# Patient Record
Sex: FEMALE | Race: ASIAN | NOT HISPANIC OR LATINO
[De-identification: names, ages, dates, MRNs, and addresses within clinical notes are randomized per-mention and may not be internally consistent; named-entity substitution may affect disease eponyms.]

---
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
## 2023-01-26 ENCOUNTER — APPOINTMENT (OUTPATIENT)
Dept: PEDIATRIC RHEUMATOLOGY | Facility: CLINIC | Age: 18
End: 2023-01-26
Payer: MEDICAID

## 2023-01-26 ENCOUNTER — APPOINTMENT (OUTPATIENT)
Dept: PEDIATRIC RHEUMATOLOGY | Facility: CLINIC | Age: 18
End: 2023-01-26

## 2023-01-26 ENCOUNTER — LABORATORY RESULT (OUTPATIENT)
Age: 18
End: 2023-01-26

## 2023-01-26 VITALS
HEART RATE: 101 BPM | BODY MASS INDEX: 24.27 KG/M2 | WEIGHT: 133.6 LBS | SYSTOLIC BLOOD PRESSURE: 122 MMHG | HEIGHT: 62.01 IN | DIASTOLIC BLOOD PRESSURE: 75 MMHG | TEMPERATURE: 97.8 F

## 2023-01-26 DIAGNOSIS — Z00.00 ENCOUNTER FOR GENERAL ADULT MEDICAL EXAMINATION W/OUT ABNORMAL FINDINGS: ICD-10-CM

## 2023-01-26 PROCEDURE — 99215 OFFICE O/P EST HI 40 MIN: CPT

## 2023-01-27 LAB
ALBUMIN SERPL ELPH-MCNC: 3.7 G/DL
ALP BLD-CCNC: 42 U/L
ALT SERPL-CCNC: 10 U/L
ANION GAP SERPL CALC-SCNC: 10 MMOL/L
APPEARANCE: CLEAR
AST SERPL-CCNC: 14 U/L
BASOPHILS # BLD AUTO: 0.06 K/UL
BASOPHILS NFR BLD AUTO: 0.9 %
BILIRUB SERPL-MCNC: 0.6 MG/DL
BILIRUBIN URINE: NEGATIVE
BLOOD URINE: ABNORMAL
BUN SERPL-MCNC: 10 MG/DL
C3 SERPL-MCNC: 115 MG/DL
C4 SERPL-MCNC: 22 MG/DL
CALCIUM SERPL-MCNC: 9.8 MG/DL
CHLORIDE SERPL-SCNC: 102 MMOL/L
CHOLEST SERPL-MCNC: 220 MG/DL
CO2 SERPL-SCNC: 27 MMOL/L
COLOR: YELLOW
CREAT SERPL-MCNC: 0.44 MG/DL
CREAT SPEC-SCNC: 139 MG/DL
CREAT/PROT UR: 0.5 RATIO
CRP SERPL-MCNC: <3 MG/L
EGFR: 143 ML/MIN/1.73M2
EOSINOPHIL # BLD AUTO: 0.12 K/UL
EOSINOPHIL NFR BLD AUTO: 1.8 %
ERYTHROCYTE [SEDIMENTATION RATE] IN BLOOD BY WESTERGREN METHOD: 47 MM/HR
ESTIMATED AVERAGE GLUCOSE: 117 MG/DL
GLUCOSE QUALITATIVE U: NEGATIVE
GLUCOSE SERPL-MCNC: 96 MG/DL
HBA1C MFR BLD HPLC: 5.7 %
HCT VFR BLD CALC: 40.5 %
HDLC SERPL-MCNC: 77 MG/DL
HGB BLD-MCNC: 12.8 G/DL
IMM GRANULOCYTES NFR BLD AUTO: 0.3 %
KETONES URINE: NEGATIVE
LDLC SERPL CALC-MCNC: 128 MG/DL
LEUKOCYTE ESTERASE URINE: NEGATIVE
LYMPHOCYTES # BLD AUTO: 2.94 K/UL
LYMPHOCYTES NFR BLD AUTO: 43.1 %
MAN DIFF?: NORMAL
MCHC RBC-ENTMCNC: 28.6 PG
MCHC RBC-ENTMCNC: 31.6 GM/DL
MCV RBC AUTO: 90.6 FL
MONOCYTES # BLD AUTO: 0.63 K/UL
MONOCYTES NFR BLD AUTO: 9.2 %
NEUTROPHILS # BLD AUTO: 3.05 K/UL
NEUTROPHILS NFR BLD AUTO: 44.7 %
NITRITE URINE: NEGATIVE
NONHDLC SERPL-MCNC: 143 MG/DL
PH URINE: 6
PLATELET # BLD AUTO: 353 K/UL
POTASSIUM SERPL-SCNC: 4.2 MMOL/L
PROT SERPL-MCNC: 6.2 G/DL
PROT UR-MCNC: 76 MG/DL
PROTEIN URINE: ABNORMAL
RBC # BLD: 4.47 M/UL
RBC # FLD: 11.8 %
SODIUM SERPL-SCNC: 139 MMOL/L
SPECIFIC GRAVITY URINE: 1.03
TRIGL SERPL-MCNC: 73 MG/DL
UROBILINOGEN URINE: NORMAL
WBC # FLD AUTO: 6.82 K/UL

## 2023-01-30 ENCOUNTER — NON-APPOINTMENT (OUTPATIENT)
Age: 18
End: 2023-01-30

## 2023-01-30 LAB — DSDNA AB SER-ACNC: 25 IU/ML

## 2023-01-30 NOTE — CONSULT LETTER
[Dear  ___] : Dear  [unfilled], [Courtesy Letter:] : I had the pleasure of seeing your patient, [unfilled], in my office today. [Please see my note below.] : Please see my note below. [Consult Closing:] : Thank you very much for allowing me to participate in the care of this patient.  If you have any questions, please do not hesitate to contact me. [Sincerely,] : Sincerely, [FreeTextEntry2] : Dr. Eugenio Hills\par [address] [place] [address], [address]\[place] [id number] [FreeTextEntry3] : Dacia López MD \par Pediatric Rheumatology Fellow \par [facility]

## 2023-01-30 NOTE — PHYSICAL EXAM
[Normal] : normal [PERRLA] : CAIO [Lips] : normal lips [Oral] : normal oral cavity  [Mucosa] : moist and pink mucosa [Palate] : normal palate [S1, S2 Present] : S1, S2 present [Peripheral Pulses] : positive peripheral pulses [Respiratory Effort] : normal respiratory effort [Clear to auscultation] : clear to auscultation [Soft] : soft [NonTender] : non tender [Non Distended] : non distended [Normal Bowel Sounds] : normal bowel sounds [No Hepatosplenomegaly] : no hepatosplenomegaly [No Abnormal Lymph Nodes Palpated] : no abnormal lymph nodes palpated [Refer to Joint Diagram Below] : refer to joint diagram below [Range Of Motion] : full range of motion [Cranial nerves grossly intact] : cranial nerves grossly intact [Intact Judgement] : intact judgement  [Insight Insight] : intact insight [Not Examined] : not examined [1] : 1 [_______] : Knee: [unfilled] [Acute distress] : no acute distress [Malar Erythema] : no malar erythema [Erythematous Conjunctiva] : nonerythematous conjunctiva [Erythematous Oropharynx] : nonerythematous oropharynx [Ulcers] : no ulcers [Lesions] : no lesions [Induration] : no induration [Erythematous] : not erythematous [Mass (___cm)] : no neck masses [Murmurs] : no murmurs [Peripheral Edema] : no peripheral edema  [Joint effusions] : no joint effusions [FreeTextEntry1] : well-appearing, improving Cushingoid facies [de-identified] : keratosis pilaris of bilateral arms, small callus on left 5th finger [de-identified] : no active arthritis today [NumbJointsActiveArthritis] : 0

## 2023-01-30 NOTE — HISTORY OF PRESENT ILLNESS
[VANNESSA] : VANNESSA [ds-DNA] : ds-DNA [Sm] : Sm [RNP] : RNP [SSA] : SSA [Yes] : The patient is using sunscreen [Glomerulonephritis] : glomerulonephritis [Noncontributory] : The patient's family history was noncontributory [Unlimited ADLs] : able to do activities of daily living without limitations [FreeTextEntry1] : Domonique presents for follow-up today. She was last seen on 11/2022 [facility] \par \par Domonique is feeling well. No complaints today. Drinking 4-5 cups of water per day. Not checking BPs at home. Normal appetite. \par \par Domonique has moved into her girlfriend's home and also got a new kitten, named Ching. \par \par Saw her primary care physician last week for physical exam - requesting A1c and lipids to be checked today. \par \par Current medications: \par - Prednisone 15 mg PO daily \par - CellCept 1500 mg PO BID \par -  mg PO daily \par - Lisinopril 30 mg PO daily \par - Vitamin D daily \par \par LMP: 1/9\par \par No fever, headache, visual changes, dizziness. mouth sores, cough, congestion, chest pain, difficulty breathing, nausea, vomiting, diarrhea, constipation, blood in the stool, abdominal pain, dysuria, hematuria, joint pain, peripheral edema, back pain, or rash.  [SLEDXDATE] : 05/20/2022

## 2023-01-30 NOTE — SOCIAL HISTORY
[Mother] : mother [___ Sisters] : [unfilled] sisters [College] : College [Sexually Active] : patient is sexually active [de-identified] : Father lives in [place] due to work [FreeTextEntry1] : Attends [employer], first year, studying business management

## 2023-01-30 NOTE — END OF VISIT
[] : Fellow [FreeTextEntry3] : I discussed this patient in a pre-clinic session with the fellow including clinical status and last set of laboratory testing results. I also saw the patient and discussed history, completed an exam and discussed the plan together with the fellow.\par \par Total time spent today included reviewing prior notes, results, and time with patient/parent.\par Time spent -40      minutes\par

## 2023-02-02 ENCOUNTER — APPOINTMENT (OUTPATIENT)
Dept: PEDIATRIC NEPHROLOGY | Facility: CLINIC | Age: 18
End: 2023-02-02
Payer: MEDICAID

## 2023-02-02 VITALS
DIASTOLIC BLOOD PRESSURE: 73 MMHG | SYSTOLIC BLOOD PRESSURE: 110 MMHG | WEIGHT: 134.56 LBS | HEIGHT: 62.4 IN | HEART RATE: 89 BPM | BODY MASS INDEX: 24.45 KG/M2 | TEMPERATURE: 97.88 F

## 2023-02-02 DIAGNOSIS — Z87.898 PERSONAL HISTORY OF OTHER SPECIFIED CONDITIONS: ICD-10-CM

## 2023-02-02 DIAGNOSIS — Z87.39 PERSONAL HISTORY OF OTHER DISEASES OF THE MUSCULOSKELETAL SYSTEM AND CONNECTIVE TISSUE: ICD-10-CM

## 2023-02-02 PROCEDURE — 81003 URINALYSIS AUTO W/O SCOPE: CPT | Mod: QW

## 2023-02-02 PROCEDURE — 99214 OFFICE O/P EST MOD 30 MIN: CPT | Mod: 25

## 2023-02-13 PROBLEM — Z87.39 HISTORY OF ARTHRITIS: Status: RESOLVED | Noted: 2022-06-08 | Resolved: 2022-10-20

## 2023-02-13 PROBLEM — Z87.898 HISTORY OF EDEMA: Status: RESOLVED | Noted: 2023-02-13 | Resolved: 2023-02-13

## 2023-02-13 NOTE — PHYSICAL EXAM
[Well Developed] : well developed [Well Nourished] : well nourished [Normal] : alert, oriented as age-appropriate, affect appropriate; no weakness, no facial asymmetry, moves all extremities normal gait- child older than 18 months [de-identified] : normocephalic atraumatic no conjunctival injection intact extraocular movements, sclera not icteric moist mucous membranes

## 2023-02-13 NOTE — CONSULT LETTER
[Consult Letter:] : I had the pleasure of evaluating your patient, [unfilled]. [Please see my note below.] : Please see my note below. [Consult Closing:] : Thank you very much for allowing me to participate in the care of this patient.  If you have any questions, please do not hesitate to contact me. [Sincerely,] : Sincerely, [FreeTextEntry3] : Antoinette Jacobson MD MSc\par Pediatric Nephrology\par [facility] \par [phone]\par

## 2023-02-23 ENCOUNTER — APPOINTMENT (OUTPATIENT)
Dept: PEDIATRIC RHEUMATOLOGY | Facility: CLINIC | Age: 18
End: 2023-02-23
Payer: MEDICAID

## 2023-02-23 ENCOUNTER — LABORATORY RESULT (OUTPATIENT)
Age: 18
End: 2023-02-23

## 2023-02-23 VITALS
WEIGHT: 137 LBS | HEIGHT: 62.4 IN | SYSTOLIC BLOOD PRESSURE: 108 MMHG | BODY MASS INDEX: 24.89 KG/M2 | DIASTOLIC BLOOD PRESSURE: 70 MMHG | TEMPERATURE: 207.68 F | HEART RATE: 88 BPM

## 2023-02-23 PROCEDURE — 99215 OFFICE O/P EST HI 40 MIN: CPT

## 2023-02-23 NOTE — HISTORY OF PRESENT ILLNESS
[VANNESSA] : VANNESSA [ds-DNA] : ds-DNA [Sm] : Sm [RNP] : RNP [SSA] : SSA [Yes] : The patient is using sunscreen [Glomerulonephritis] : glomerulonephritis [Noncontributory] : The patient's family history was noncontributory [Unlimited ADLs] : able to do activities of daily living without limitations [FreeTextEntry1] : Domonique presents for follow-up today. \par \par Domonique is feeling well. No complaints today. Drinking 36+ oz water per day. Not checking BPs at home. Normal appetite. \par \par Domonique has moved into her girlfriend's home and also got a new kitten, named Ching. School is going well. \par \par Noted a small overgrown nail on left 2nd toe since yesterday - using antiseptic solution to prevent infection. No fevers. \par \par Current medications: \par - Prednisone 10 mg PO daily \par - CellCept 1500 mg PO BID \par -  mg PO daily \par - Lisinopril 30 mg PO daily \par - Vitamin D daily (OTC) \par \par LMP: 2/19 - 2/22 \par \par No fever, headache, visual changes, dizziness. mouth sores, cough, congestion, chest pain, difficulty breathing, nausea, vomiting, diarrhea, constipation, blood in the stool, abdominal pain, dysuria, hematuria, joint pain, peripheral edema, back pain, or rash.  [SLEDXDATE] : 05/20/2022

## 2023-02-23 NOTE — PHYSICAL EXAM
[Normal] : normal [PERRLA] : CAIO [Lips] : normal lips [Oral] : normal oral cavity  [Mucosa] : moist and pink mucosa [Palate] : normal palate [S1, S2 Present] : S1, S2 present [Peripheral Pulses] : positive peripheral pulses [Respiratory Effort] : normal respiratory effort [Clear to auscultation] : clear to auscultation [Soft] : soft [NonTender] : non tender [Non Distended] : non distended [Normal Bowel Sounds] : normal bowel sounds [No Hepatosplenomegaly] : no hepatosplenomegaly [No Abnormal Lymph Nodes Palpated] : no abnormal lymph nodes palpated [Refer to Joint Diagram Below] : refer to joint diagram below [Range Of Motion] : full range of motion [Cranial nerves grossly intact] : cranial nerves grossly intact [Intact Judgement] : intact judgement  [Insight Insight] : intact insight [Not Examined] : not examined [1] : 1 [_______] : Knee: [unfilled] [Acute distress] : no acute distress [Malar Erythema] : no malar erythema [Erythematous Conjunctiva] : nonerythematous conjunctiva [Erythematous Oropharynx] : nonerythematous oropharynx [Ulcers] : no ulcers [Lesions] : no lesions [Induration] : no induration [Erythematous] : not erythematous [Mass (___cm)] : no neck masses [Murmurs] : no murmurs [Peripheral Edema] : no peripheral edema  [Joint effusions] : no joint effusions [FreeTextEntry1] : well-appearing, improving Cushingoid facies [de-identified] : keratosis pilaris of bilateral arms, ingrown nail on left 2nd toe with surrounding erythema [de-identified] : no active arthritis today [NumbJointsActiveArthritis] : 0

## 2023-02-23 NOTE — CONSULT LETTER
[Dear  ___] : Dear  [unfilled], [Courtesy Letter:] : I had the pleasure of seeing your patient, [unfilled], in my office today. [Please see my note below.] : Please see my note below. [Consult Closing:] : Thank you very much for allowing me to participate in the care of this patient.  If you have any questions, please do not hesitate to contact me. [Sincerely,] : Sincerely, [FreeTextEntry2] : Dr. Eugenio Hills\par [address] [place] [address], [address]\[place] [id number] [FreeTextEntry3] : Dacia López MD \par Pediatric Rheumatology Fellow \par [facility]

## 2023-02-23 NOTE — END OF VISIT
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 Overall feeling well. Has slight overgrowth of nail on L. 2nd toe with some erythema; no fevers reported. ROS otherwise negative. \par PE: L. 2nd toe with some slight erythema around nail bed, no pain/tenderness; no objective arthritis; keratosis pilaris on upper extremities b/l; oropharynx clear \par A/P: Domonique is a 18yo F with SLE and Class IV/V LN, currently managed on prednisone, MMF, HCQ and Lisinopril; she reports compliance and is overall well. Continue prednisone taper. To monitor toe symptoms and to seek medical evaluation if any worsening of pain, erythema, or any fevers. Needs ophthalmology follow-up. Return precautions otherwise reviewed. Follow-up in one month. Patient verbalized understanding of this plan. \par  [Time Spent: ___ minutes] : I have spent [unfilled] minutes of time on the encounter.

## 2023-02-23 NOTE — SOCIAL HISTORY
[Mother] : mother [___ Sisters] : [unfilled] sisters [College] : College [Sexually Active] : patient is sexually active [de-identified] : Father lives in [place] due to work [FreeTextEntry1] : Attends [employer], first year, studying business management

## 2023-02-24 ENCOUNTER — NON-APPOINTMENT (OUTPATIENT)
Age: 18
End: 2023-02-24

## 2023-02-24 LAB
ALBUMIN SERPL ELPH-MCNC: 3.9 G/DL
ALP BLD-CCNC: 45 U/L
ALT SERPL-CCNC: 14 U/L
ANION GAP SERPL CALC-SCNC: 11 MMOL/L
APPEARANCE: CLEAR
AST SERPL-CCNC: 17 U/L
BASOPHILS # BLD AUTO: 0.02 K/UL
BASOPHILS NFR BLD AUTO: 0.3 %
BILIRUB SERPL-MCNC: 0.2 MG/DL
BILIRUBIN URINE: NEGATIVE
BLOOD URINE: ABNORMAL
BUN SERPL-MCNC: 11 MG/DL
C3 SERPL-MCNC: 119 MG/DL
C4 SERPL-MCNC: 21 MG/DL
CALCIUM SERPL-MCNC: 9.9 MG/DL
CHLORIDE SERPL-SCNC: 101 MMOL/L
CO2 SERPL-SCNC: 26 MMOL/L
COLOR: NORMAL
CREAT SERPL-MCNC: 0.41 MG/DL
CREAT SPEC-SCNC: 66 MG/DL
CREAT/PROT UR: 0.3 RATIO
CRP SERPL-MCNC: <3 MG/L
EGFR: 145 ML/MIN/1.73M2
EOSINOPHIL # BLD AUTO: 0.02 K/UL
EOSINOPHIL NFR BLD AUTO: 0.3 %
ERYTHROCYTE [SEDIMENTATION RATE] IN BLOOD BY WESTERGREN METHOD: 20 MM/HR
GLUCOSE QUALITATIVE U: NEGATIVE
GLUCOSE SERPL-MCNC: 102 MG/DL
HCT VFR BLD CALC: 39 %
HGB BLD-MCNC: 12.5 G/DL
IMM GRANULOCYTES NFR BLD AUTO: 0.5 %
KETONES URINE: NEGATIVE
LEUKOCYTE ESTERASE URINE: NEGATIVE
LYMPHOCYTES # BLD AUTO: 1.56 K/UL
LYMPHOCYTES NFR BLD AUTO: 23.4 %
MAN DIFF?: NORMAL
MCHC RBC-ENTMCNC: 29.1 PG
MCHC RBC-ENTMCNC: 32.1 GM/DL
MCV RBC AUTO: 90.9 FL
MONOCYTES # BLD AUTO: 0.28 K/UL
MONOCYTES NFR BLD AUTO: 4.2 %
NEUTROPHILS # BLD AUTO: 4.75 K/UL
NEUTROPHILS NFR BLD AUTO: 71.3 %
NITRITE URINE: NEGATIVE
PH URINE: 6
PLATELET # BLD AUTO: 349 K/UL
POTASSIUM SERPL-SCNC: 4.6 MMOL/L
PROT SERPL-MCNC: 6.1 G/DL
PROT UR-MCNC: 18 MG/DL
PROTEIN URINE: NORMAL
RBC # BLD: 4.29 M/UL
RBC # FLD: 11.9 %
SODIUM SERPL-SCNC: 138 MMOL/L
SPECIFIC GRAVITY URINE: 1.02
UROBILINOGEN URINE: NORMAL
WBC # FLD AUTO: 6.66 K/UL

## 2023-02-27 LAB — DSDNA AB SER-ACNC: 23 IU/ML

## 2023-03-22 ENCOUNTER — RX RENEWAL (OUTPATIENT)
Age: 18
End: 2023-03-22

## 2023-03-23 ENCOUNTER — APPOINTMENT (OUTPATIENT)
Dept: PEDIATRIC RHEUMATOLOGY | Facility: CLINIC | Age: 18
End: 2023-03-23
Payer: MEDICAID

## 2023-03-23 VITALS
SYSTOLIC BLOOD PRESSURE: 109 MMHG | HEIGHT: 62.4 IN | DIASTOLIC BLOOD PRESSURE: 71 MMHG | HEART RATE: 85 BPM | WEIGHT: 139.33 LBS | BODY MASS INDEX: 25.32 KG/M2 | TEMPERATURE: 207.86 F

## 2023-03-23 PROCEDURE — 99215 OFFICE O/P EST HI 40 MIN: CPT

## 2023-03-24 LAB
ALBUMIN SERPL ELPH-MCNC: 3.8 G/DL
ALP BLD-CCNC: 51 U/L
ALT SERPL-CCNC: 17 U/L
ANION GAP SERPL CALC-SCNC: 11 MMOL/L
AST SERPL-CCNC: 17 U/L
BILIRUB SERPL-MCNC: 0.3 MG/DL
BUN SERPL-MCNC: 11 MG/DL
CALCIUM SERPL-MCNC: 9.1 MG/DL
CHLORIDE SERPL-SCNC: 104 MMOL/L
CO2 SERPL-SCNC: 22 MMOL/L
CREAT SERPL-MCNC: 0.47 MG/DL
EGFR: 141 ML/MIN/1.73M2
ERYTHROCYTE [SEDIMENTATION RATE] IN BLOOD BY WESTERGREN METHOD: 16 MM/HR
GLUCOSE SERPL-MCNC: 104 MG/DL
POTASSIUM SERPL-SCNC: 4.6 MMOL/L
PROT SERPL-MCNC: 6.1 G/DL
SODIUM SERPL-SCNC: 138 MMOL/L

## 2023-03-26 LAB
BASOPHILS # BLD AUTO: 0.03 K/UL
BASOPHILS NFR BLD AUTO: 0.4 %
C3 SERPL-MCNC: 115 MG/DL
C4 SERPL-MCNC: 23 MG/DL
CRP SERPL-MCNC: <3 MG/L
DSDNA AB SER-ACNC: 26 IU/ML
EOSINOPHIL # BLD AUTO: 0.08 K/UL
EOSINOPHIL NFR BLD AUTO: 1.2 %
HCT VFR BLD CALC: 37.1 %
HGB BLD-MCNC: 12 G/DL
IMM GRANULOCYTES NFR BLD AUTO: 0.3 %
LYMPHOCYTES # BLD AUTO: 1.85 K/UL
LYMPHOCYTES NFR BLD AUTO: 27.4 %
MAN DIFF?: NORMAL
MCHC RBC-ENTMCNC: 29.3 PG
MCHC RBC-ENTMCNC: 32.3 GM/DL
MCV RBC AUTO: 90.5 FL
MONOCYTES # BLD AUTO: 0.45 K/UL
MONOCYTES NFR BLD AUTO: 6.7 %
NEUTROPHILS # BLD AUTO: 4.33 K/UL
NEUTROPHILS NFR BLD AUTO: 64 %
PLATELET # BLD AUTO: 336 K/UL
RBC # BLD: 4.1 M/UL
RBC # FLD: 11.8 %
WBC # FLD AUTO: 6.76 K/UL

## 2023-04-03 NOTE — CONSULT LETTER
[Dear  ___] : Dear  [unfilled], [Courtesy Letter:] : I had the pleasure of seeing your patient, [unfilled], in my office today. [Please see my note below.] : Please see my note below. [Consult Closing:] : Thank you very much for allowing me to participate in the care of this patient.  If you have any questions, please do not hesitate to contact me. [Sincerely,] : Sincerely, [FreeTextEntry2] : Dr. Eugenio Hills\par [address] [place] [address], [address]\[place] [phone] [FreeTextEntry3] : Dacia López MD \par Pediatric Rheumatology Fellow \par [facility]

## 2023-04-03 NOTE — PHYSICAL EXAM
[Normal] : normal [PERRLA] : CAIO [Lips] : normal lips [Oral] : normal oral cavity  [Mucosa] : moist and pink mucosa [Palate] : normal palate [S1, S2 Present] : S1, S2 present [Peripheral Pulses] : positive peripheral pulses [Respiratory Effort] : normal respiratory effort [Clear to auscultation] : clear to auscultation [Soft] : soft [NonTender] : non tender [Non Distended] : non distended [Normal Bowel Sounds] : normal bowel sounds [No Hepatosplenomegaly] : no hepatosplenomegaly [No Abnormal Lymph Nodes Palpated] : no abnormal lymph nodes palpated [Refer to Joint Diagram Below] : refer to joint diagram below [Range Of Motion] : full range of motion [Cranial nerves grossly intact] : cranial nerves grossly intact [Intact Judgement] : intact judgement  [Not Examined] : not examined [Insight Insight] : intact insight [1] : 1 [_______] : Knee: [unfilled] [Acute distress] : no acute distress [Malar Erythema] : no malar erythema [Erythematous Conjunctiva] : nonerythematous conjunctiva [Erythematous Oropharynx] : nonerythematous oropharynx [Ulcers] : no ulcers [Lesions] : no lesions [Induration] : no induration [Erythematous] : not erythematous [Mass (___cm)] : no neck masses [Murmurs] : no murmurs [Peripheral Edema] : no peripheral edema  [Joint effusions] : no joint effusions [FreeTextEntry1] : well-appearing, improving Cushingoid facies [de-identified] : keratosis pilaris of bilateral arms [de-identified] : no active arthritis [NumbJointsActiveArthritis] : 0

## 2023-04-03 NOTE — SOCIAL HISTORY
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

## 2023-04-03 NOTE — END OF VISIT
[] : Fellow [FreeTextEntry3] : doing well on current medication regimen without clinical or serologic evidence of disease flare.\par Continue steroid taper.\par \par Plan well outlined per Dr López above.\par \par I discussed this patient in a pre-clinic session with the fellow including clinical status and last set of laboratory testing results. I also saw the patient and discussed history, completed an exam and discussed the plan together with the fellow.\par \par Total time spent today included reviewing prior notes, results, and time with patient/parent.\par Time spent -    41  minutes\par

## 2023-04-03 NOTE — HISTORY OF PRESENT ILLNESS
[VANNESSA] : VANNESSA [ds-DNA] : ds-DNA [Sm] : Sm [RNP] : RNP [SSA] : SSA [Yes] : The patient is using sunscreen [Glomerulonephritis] : glomerulonephritis [Noncontributory] : The patient's family history was noncontributory [Unlimited ADLs] : able to do activities of daily living without limitations [FreeTextEntry1] : Domonique presents for follow-up today. \par \par Domonique is feeling well. No complaints today. Normal appetite. Ingrown toenail healed without any issues. \par \par Domonique has moved into her girlfriend's home and also got a new kitten, named Ching. School is going well. Planning to travel to [place] during her spring break. 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 mouth sores, cough, congestion, chest pain, difficulty breathing, nausea, vomiting, diarrhea, constipation, blood in the stool, abdominal pain, dysuria, hematuria, joint pain, peripheral edema, back pain, or rash.  [SLEDXDATE] : 05/20/2022

## 2023-04-04 ENCOUNTER — APPOINTMENT (OUTPATIENT)
Dept: PEDIATRIC NEPHROLOGY | Facility: CLINIC | Age: 18
End: 2023-04-04
Payer: MEDICAID

## 2023-04-04 VITALS
TEMPERATURE: 97.52 F | HEART RATE: 99 BPM | WEIGHT: 135.8 LBS | SYSTOLIC BLOOD PRESSURE: 108 MMHG | DIASTOLIC BLOOD PRESSURE: 75 MMHG | BODY MASS INDEX: 24.68 KG/M2 | HEIGHT: 62.2 IN

## 2023-04-04 PROCEDURE — 99214 OFFICE O/P EST MOD 30 MIN: CPT | Mod: 25

## 2023-04-04 PROCEDURE — 81003 URINALYSIS AUTO W/O SCOPE: CPT | Mod: QW

## 2023-04-05 ENCOUNTER — NON-APPOINTMENT (OUTPATIENT)
Age: 18
End: 2023-04-05

## 2023-04-05 LAB
APPEARANCE: CLEAR
BACTERIA: NEGATIVE
BILIRUBIN URINE: NEGATIVE
BLOOD URINE: ABNORMAL
COLOR: YELLOW
CREAT SPEC-SCNC: 112 MG/DL
CREAT/PROT UR: 0.3 RATIO
GLUCOSE QUALITATIVE U: NEGATIVE
HYALINE CASTS: 1 /LPF
KETONES URINE: NEGATIVE
LEUKOCYTE ESTERASE URINE: NEGATIVE
MICROSCOPIC-UA: NORMAL
NITRITE URINE: NEGATIVE
PH URINE: 6
PROT UR-MCNC: 30 MG/DL
PROTEIN URINE: ABNORMAL
RED BLOOD CELLS URINE: 7 /HPF
SPECIFIC GRAVITY URINE: 1.02
SQUAMOUS EPITHELIAL CELLS: 1 /HPF
UROBILINOGEN URINE: NORMAL
WHITE BLOOD CELLS URINE: 2 /HPF

## 2023-04-17 NOTE — CONSULT LETTER
[Consult Letter:] : I had the pleasure of evaluating your patient, [unfilled]. [Please see my note below.] : Please see my note below. [Consult Closing:] : Thank you very much for allowing me to participate in the care of this patient.  If you have any questions, please do not hesitate to contact me. [Sincerely,] : Sincerely, [FreeTextEntry3] : Antoinette Jacobson MD MSc\par Pediatric Nephrology\par [facility] \par [phone]\par

## 2023-04-17 NOTE — REASON FOR VISIT
[Follow-Up] : a follow-up visit for [Patient] : patient [Medical Records] : medical records [FreeTextEntry3] : Lupus Nephritis class IV/V

## 2023-04-17 NOTE — PHYSICAL EXAM
[Well Developed] : well developed [Well Nourished] : well nourished [Normal] : alert, oriented as age-appropriate, affect appropriate; no weakness, no facial asymmetry, moves all extremities normal gait- child older than 18 months [de-identified] : normocephalic atraumatic no conjunctival injection intact extraocular movements, sclera not icteric moist mucous membranes

## 2023-04-25 ENCOUNTER — NON-APPOINTMENT (OUTPATIENT)
Age: 18
End: 2023-04-25

## 2023-04-27 ENCOUNTER — LABORATORY RESULT (OUTPATIENT)
Age: 18
End: 2023-04-27

## 2023-04-27 ENCOUNTER — NON-APPOINTMENT (OUTPATIENT)
Age: 18
End: 2023-04-27

## 2023-04-27 ENCOUNTER — APPOINTMENT (OUTPATIENT)
Dept: PEDIATRIC RHEUMATOLOGY | Facility: CLINIC | Age: 18
End: 2023-04-27
Payer: MEDICAID

## 2023-04-27 VITALS
HEART RATE: 90 BPM | DIASTOLIC BLOOD PRESSURE: 82 MMHG | TEMPERATURE: 98 F | BODY MASS INDEX: 24.32 KG/M2 | WEIGHT: 133.82 LBS | HEIGHT: 62.28 IN | SYSTOLIC BLOOD PRESSURE: 117 MMHG

## 2023-04-27 PROCEDURE — 99215 OFFICE O/P EST HI 40 MIN: CPT

## 2023-04-27 RX ORDER — PREDNISONE 5 MG/1
5 TABLET ORAL
Qty: 20 | Refills: 0 | Status: DISCONTINUED | COMMUNITY
Start: 2022-12-16 | End: 2023-04-27

## 2023-04-27 NOTE — PHYSICAL EXAM
[Normal] : normal [PERRLA] : CAIO [Lips] : normal lips [Oral] : normal oral cavity  [Mucosa] : moist and pink mucosa [Palate] : normal palate [S1, S2 Present] : S1, S2 present [Peripheral Pulses] : positive peripheral pulses [Respiratory Effort] : normal respiratory effort [Clear to auscultation] : clear to auscultation [Soft] : soft [NonTender] : non tender [Non Distended] : non distended [Normal Bowel Sounds] : normal bowel sounds [No Hepatosplenomegaly] : no hepatosplenomegaly [No Abnormal Lymph Nodes Palpated] : no abnormal lymph nodes palpated [Refer to Joint Diagram Below] : refer to joint diagram below [Range Of Motion] : full range of motion [Cranial nerves grossly intact] : cranial nerves grossly intact [Intact Judgement] : intact judgement  [Insight Insight] : intact insight [Not Examined] : not examined [_______] : Knee: [unfilled] [0] : 0 [Acute distress] : no acute distress [Malar Erythema] : no malar erythema [Erythematous Conjunctiva] : nonerythematous conjunctiva [Erythematous Oropharynx] : nonerythematous oropharynx [Ulcers] : no ulcers [Lesions] : no lesions [Induration] : no induration [Erythematous] : not erythematous [Mass (___cm)] : no neck masses [Murmurs] : no murmurs [Peripheral Edema] : no peripheral edema  [Joint effusions] : no joint effusions [FreeTextEntry1] : well-appearing, improving Cushingoid facies [de-identified] : keratosis pilaris of bilateral arms [de-identified] : no active arthritis [NumbJointsActiveArthritis] : 0

## 2023-04-27 NOTE — CONSULT LETTER
[Dear  ___] : Dear  [unfilled], [Courtesy Letter:] : I had the pleasure of seeing your patient, [unfilled], in my office today. [Please see my note below.] : Please see my note below. [Consult Closing:] : Thank you very much for allowing me to participate in the care of this patient.  If you have any questions, please do not hesitate to contact me. [Sincerely,] : Sincerely, [FreeTextEntry2] : Dr. Eugenio Hills\par [address] [place] [address], [address]\[place] [phone] [FreeTextEntry3] : Dacia López MD \par Pediatric Rheumatology Fellow \par [facility]

## 2023-04-27 NOTE — SOCIAL HISTORY
[Mother] : mother [___ Sisters] : [unfilled] sisters [College] : College [Sexually Active] : patient is sexually active [de-identified] : Father lives in [place] due to work [FreeTextEntry1] : Attends [employer], first year, studying business management

## 2023-04-27 NOTE — HISTORY OF PRESENT ILLNESS
[VANNESSA] : VANNESSA [ds-DNA] : ds-DNA [Sm] : Sm [RNP] : RNP [SSA] : SSA [Yes] : The patient is using sunscreen [Glomerulonephritis] : glomerulonephritis [Noncontributory] : The patient's family history was noncontributory [Unlimited ADLs] : able to do activities of daily living without limitations [FreeTextEntry1] : Qi presents for follow-up today. \par \par Qi is feeling well. No complaints today. Normal appetite. Some knee pain while going up and down stairs. No joint swelling or morning stiffness. Off steroids for the past week. \par \par Current medications: \par - CellCept 1500 mg PO BID \par -  mg PO daily \par - Lisinopril 30 mg PO daily \par - Vitamin D daily (OTC) \par \par LMP: April 1 \par \par Using sunscreen consistently. 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 She also plans to visit [place] from May 29-30. 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 mouth sores, cough, congestion, chest pain, difficulty breathing, nausea, vomiting, diarrhea, constipation, blood in the stool, abdominal pain, dysuria, hematuria, joint pain, peripheral edema, back pain, or rash.  [SLEDXDATE] : 05/20/2022

## 2023-04-27 NOTE — END OF VISIT
[] : Fellow [FreeTextEntry3] : \par Qi is doping well She has managed to wean off steroids Will check her labs and she should FU in 6-8- weeks

## 2023-04-28 ENCOUNTER — NON-APPOINTMENT (OUTPATIENT)
Age: 18
End: 2023-04-28

## 2023-04-28 LAB
25(OH)D3 SERPL-MCNC: 32.3 NG/ML
ALBUMIN SERPL ELPH-MCNC: 3.9 G/DL
ALP BLD-CCNC: 55 U/L
ALT SERPL-CCNC: 12 U/L
ANION GAP SERPL CALC-SCNC: 11 MMOL/L
APPEARANCE: CLEAR
AST SERPL-CCNC: 17 U/L
B2 GLYCOPROT1 IGA SERPL IA-ACNC: <5 SAU
B2 GLYCOPROT1 IGG SER-ACNC: <5 SGU
B2 GLYCOPROT1 IGM SER-ACNC: 8.1 SMU
BASOPHILS # BLD AUTO: 0.03 K/UL
BASOPHILS NFR BLD AUTO: 0.6 %
BILIRUB SERPL-MCNC: 0.6 MG/DL
BILIRUBIN URINE: NEGATIVE
BLOOD URINE: ABNORMAL
BUN SERPL-MCNC: 9 MG/DL
C3 SERPL-MCNC: 118 MG/DL
C4 SERPL-MCNC: 22 MG/DL
CALCIUM SERPL-MCNC: 9.5 MG/DL
CARDIOLIPIN AB SER IA-ACNC: NEGATIVE
CHLORIDE SERPL-SCNC: 104 MMOL/L
CO2 SERPL-SCNC: 23 MMOL/L
COLOR: YELLOW
CREAT SERPL-MCNC: 0.46 MG/DL
CREAT SPEC-SCNC: 106 MG/DL
CREAT/PROT UR: 0.4 RATIO
CRP SERPL-MCNC: <3 MG/L
EGFR: 141 ML/MIN/1.73M2
ENA RNP AB SER IA-ACNC: >8 AL
ENA SM AB SER IA-ACNC: >8 AL
ENA SS-A AB SER IA-ACNC: 0.2 AL
ENA SS-B AB SER IA-ACNC: <0.2 AL
EOSINOPHIL # BLD AUTO: 0.21 K/UL
EOSINOPHIL NFR BLD AUTO: 4.2 %
ERYTHROCYTE [SEDIMENTATION RATE] IN BLOOD BY WESTERGREN METHOD: 28 MM/HR
GLUCOSE QUALITATIVE U: NEGATIVE MG/DL
GLUCOSE SERPL-MCNC: 90 MG/DL
HCT VFR BLD CALC: 37.4 %
HGB BLD-MCNC: 12.3 G/DL
IMM GRANULOCYTES NFR BLD AUTO: 0.2 %
KETONES URINE: NEGATIVE MG/DL
LEUKOCYTE ESTERASE URINE: NEGATIVE
LYMPHOCYTES # BLD AUTO: 1.99 K/UL
LYMPHOCYTES NFR BLD AUTO: 39.6 %
MAN DIFF?: NORMAL
MCHC RBC-ENTMCNC: 29.4 PG
MCHC RBC-ENTMCNC: 32.9 GM/DL
MCV RBC AUTO: 89.3 FL
MONOCYTES # BLD AUTO: 0.61 K/UL
MONOCYTES NFR BLD AUTO: 12.2 %
NEUTROPHILS # BLD AUTO: 2.17 K/UL
NEUTROPHILS NFR BLD AUTO: 43.2 %
NITRITE URINE: NEGATIVE
PH URINE: 6.5
PLATELET # BLD AUTO: 294 K/UL
POTASSIUM SERPL-SCNC: 4.6 MMOL/L
PROT SERPL-MCNC: 6.6 G/DL
PROT UR-MCNC: 37 MG/DL
PROTEIN URINE: 30 MG/DL
RBC # BLD: 4.19 M/UL
RBC # FLD: 12 %
SODIUM SERPL-SCNC: 138 MMOL/L
SPECIFIC GRAVITY URINE: 1.02
UROBILINOGEN URINE: 0.2 MG/DL
WBC # FLD AUTO: 5.02 K/UL

## 2023-05-01 LAB
CARDIOLIPIN IGM SER-MCNC: 11.8 MPL
CARDIOLIPIN IGM SER-MCNC: <5 GPL
CONFIRM: 28.3 SEC
DRVVT IMM 1:2 NP PPP: NORMAL
DRVVT SCREEN TO CONFIRM RATIO: 1 RATIO
DSDNA AB SER-ACNC: 25 IU/ML
SCREEN DRVVT: 33.4 SEC

## 2023-06-08 ENCOUNTER — LABORATORY RESULT (OUTPATIENT)
Age: 18
End: 2023-06-08

## 2023-06-08 ENCOUNTER — APPOINTMENT (OUTPATIENT)
Dept: PEDIATRIC RHEUMATOLOGY | Facility: CLINIC | Age: 18
End: 2023-06-08
Payer: MEDICAID

## 2023-06-08 VITALS
SYSTOLIC BLOOD PRESSURE: 102 MMHG | WEIGHT: 136.47 LBS | BODY MASS INDEX: 24.8 KG/M2 | HEART RATE: 85 BPM | TEMPERATURE: 98.2 F | HEIGHT: 62.36 IN | DIASTOLIC BLOOD PRESSURE: 67 MMHG

## 2023-06-08 LAB
ALBUMIN SERPL ELPH-MCNC: 4.2 G/DL
ALP BLD-CCNC: 64 U/L
ALT SERPL-CCNC: 11 U/L
ANION GAP SERPL CALC-SCNC: 11 MMOL/L
APPEARANCE: CLEAR
AST SERPL-CCNC: 16 U/L
BILIRUB SERPL-MCNC: 0.7 MG/DL
BILIRUBIN URINE: NEGATIVE
BLOOD URINE: ABNORMAL
BUN SERPL-MCNC: 8 MG/DL
C3 SERPL-MCNC: 116 MG/DL
C4 SERPL-MCNC: 19 MG/DL
CALCIUM SERPL-MCNC: 9.5 MG/DL
CHLORIDE SERPL-SCNC: 105 MMOL/L
CO2 SERPL-SCNC: 26 MMOL/L
COLOR: YELLOW
CREAT SERPL-MCNC: 0.46 MG/DL
CREAT SPEC-SCNC: 140 MG/DL
CREAT/PROT UR: 0.1 RATIO
CRP SERPL-MCNC: <3 MG/L
EGFR: 141 ML/MIN/1.73M2
ERYTHROCYTE [SEDIMENTATION RATE] IN BLOOD BY WESTERGREN METHOD: 10 MM/HR
GLUCOSE QUALITATIVE U: NEGATIVE MG/DL
GLUCOSE SERPL-MCNC: 93 MG/DL
KETONES URINE: NEGATIVE MG/DL
LEUKOCYTE ESTERASE URINE: NEGATIVE
NITRITE URINE: NEGATIVE
PH URINE: 5.5
POTASSIUM SERPL-SCNC: 4.8 MMOL/L
PROT SERPL-MCNC: 6.8 G/DL
PROT UR-MCNC: 9 MG/DL
PROTEIN URINE: NEGATIVE MG/DL
SODIUM SERPL-SCNC: 142 MMOL/L
SPECIFIC GRAVITY URINE: 1.02
UROBILINOGEN URINE: 0.2 MG/DL

## 2023-06-08 PROCEDURE — 99215 OFFICE O/P EST HI 40 MIN: CPT

## 2023-06-09 NOTE — SOCIAL HISTORY
[Mother] : mother [___ Sisters] : [unfilled] sisters [College] : College [Sexually Active] : patient is sexually active [de-identified] : Father lives in [place] due to work [FreeTextEntry1] : Attends [employer], finished first year, studying business management

## 2023-06-09 NOTE — CONSULT LETTER
[Dear  ___] : Dear  [unfilled], [Courtesy Letter:] : I had the pleasure of seeing your patient, [unfilled], in my office today. [Please see my note below.] : Please see my note below. [Consult Closing:] : Thank you very much for allowing me to participate in the care of this patient.  If you have any questions, please do not hesitate to contact me. [Sincerely,] : Sincerely, [FreeTextEntry2] : Dr. Eugenio Hills\par [address] [place] [address], [address]\[place] [phone] [FreeTextEntry3] : Dacia López MD \par Pediatric Rheumatology Fellow \par [facility]

## 2023-06-09 NOTE — HISTORY OF PRESENT ILLNESS
[VANNESSA] : VANNESSA [ds-DNA] : ds-DNA [Sm] : Sm [RNP] : RNP [SSA] : SSA [Yes] : The patient is using sunscreen [Glomerulonephritis] : glomerulonephritis [Noncontributory] : The patient's family history was noncontributory [Unlimited ADLs] : able to do activities of daily living without limitations [FreeTextEntry1] : Domonique presents for follow-up today. \par \par Domonique is feeling well. Reports that her right elbow feels stiff, keeps elbows flexed while working - able to extend arm fully but has to do it slowly.  No joint swelling or morning stiffness. \par \par Current medications: \par - CellCept 1500 mg PO BID \par -  mg PO daily \par - Lisinopril 30 mg PO daily \par - Vitamin D daily (OTC) \par \par LMP: end of May \par \par Using sunscreen consistently. 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 mouth sores, cough, congestion, chest pain, difficulty breathing, nausea, vomiting, diarrhea, constipation, blood in the stool, abdominal pain, dysuria, hematuria, joint pain, peripheral edema, back pain, or rash.  [SLEDXDATE] : 05/20/2022

## 2023-06-09 NOTE — PHYSICAL EXAM
[Normal] : normal [PERRLA] : CAIO [Lips] : normal lips [Oral] : normal oral cavity  [Mucosa] : moist and pink mucosa [Palate] : normal palate [S1, S2 Present] : S1, S2 present [Peripheral Pulses] : positive peripheral pulses [Respiratory Effort] : normal respiratory effort [Soft] : soft [Clear to auscultation] : clear to auscultation [Non Distended] : non distended [NonTender] : non tender [Normal Bowel Sounds] : normal bowel sounds [No Hepatosplenomegaly] : no hepatosplenomegaly [No Abnormal Lymph Nodes Palpated] : no abnormal lymph nodes palpated [Range Of Motion] : full range of motion [Refer to Joint Diagram Below] : refer to joint diagram below [Cranial nerves grossly intact] : cranial nerves grossly intact [Intact Judgement] : intact judgement  [Insight Insight] : intact insight [Not Examined] : not examined [0] : 0 [_______] : Knee: [unfilled] [Acute distress] : no acute distress [Malar Erythema] : no malar erythema [Erythematous Conjunctiva] : nonerythematous conjunctiva [Erythematous Oropharynx] : nonerythematous oropharynx [Ulcers] : no ulcers [Lesions] : no lesions [Induration] : no induration [Erythematous] : not erythematous [Mass (___cm)] : no neck masses [Murmurs] : no murmurs [Peripheral Edema] : no peripheral edema  [Joint effusions] : no joint effusions [de-identified] : keratosis pilaris of bilateral arms [FreeTextEntry1] : well-appearing, improving Cushingoid facies [de-identified] : no active arthritis, limitation in extension of right elbow, but able to get to full extension with gradual stretching [NumbJointsActiveArthritis] : 0

## 2023-06-12 LAB — DSDNA AB SER-ACNC: 41 IU/ML

## 2023-06-29 ENCOUNTER — TRANSCRIPTION ENCOUNTER (OUTPATIENT)
Age: 18
End: 2023-06-29

## 2023-07-24 ENCOUNTER — RX RENEWAL (OUTPATIENT)
Age: 18
End: 2023-07-24

## 2023-07-27 ENCOUNTER — APPOINTMENT (OUTPATIENT)
Dept: PEDIATRIC RHEUMATOLOGY | Facility: CLINIC | Age: 18
End: 2023-07-27
Payer: MEDICAID

## 2023-07-27 ENCOUNTER — LABORATORY RESULT (OUTPATIENT)
Age: 18
End: 2023-07-27

## 2023-07-27 VITALS
BODY MASS INDEX: 24.52 KG/M2 | TEMPERATURE: 96.4 F | DIASTOLIC BLOOD PRESSURE: 60 MMHG | HEIGHT: 62.44 IN | SYSTOLIC BLOOD PRESSURE: 91 MMHG | HEART RATE: 84 BPM | WEIGHT: 136.69 LBS

## 2023-07-27 LAB
APPEARANCE: CLEAR
BILIRUBIN URINE: NEGATIVE
BLOOD URINE: ABNORMAL
COLOR: YELLOW
GLUCOSE QUALITATIVE U: NEGATIVE MG/DL
KETONES URINE: NEGATIVE MG/DL
LEUKOCYTE ESTERASE URINE: NEGATIVE
NITRITE URINE: NEGATIVE
PH URINE: 5.5
PROTEIN URINE: NORMAL MG/DL
SPECIFIC GRAVITY URINE: >1.03
UROBILINOGEN URINE: 0.2 MG/DL

## 2023-07-27 PROCEDURE — 99215 OFFICE O/P EST HI 40 MIN: CPT

## 2023-07-28 LAB
CREAT SPEC-SCNC: 182 MG/DL
CREAT/PROT UR: 0.1 RATIO
PROT UR-MCNC: 17 MG/DL

## 2023-07-30 LAB
ALBUMIN SERPL ELPH-MCNC: 4.4 G/DL
ALP BLD-CCNC: 62 U/L
ALT SERPL-CCNC: 12 U/L
ANION GAP SERPL CALC-SCNC: 11 MMOL/L
AST SERPL-CCNC: 14 U/L
BILIRUB SERPL-MCNC: 0.4 MG/DL
BUN SERPL-MCNC: 9 MG/DL
C3 SERPL-MCNC: 117 MG/DL
C4 SERPL-MCNC: 20 MG/DL
CALCIUM SERPL-MCNC: 9.4 MG/DL
CHLORIDE SERPL-SCNC: 104 MMOL/L
CO2 SERPL-SCNC: 26 MMOL/L
CREAT SERPL-MCNC: 0.45 MG/DL
CRP SERPL-MCNC: <3 MG/L
DSDNA AB SER-ACNC: 30 IU/ML
EGFR: 141 ML/MIN/1.73M2
ERYTHROCYTE [SEDIMENTATION RATE] IN BLOOD BY WESTERGREN METHOD: 10 MM/HR
GLUCOSE SERPL-MCNC: 99 MG/DL
POTASSIUM SERPL-SCNC: 4.5 MMOL/L
PROT SERPL-MCNC: 7.2 G/DL
SODIUM SERPL-SCNC: 140 MMOL/L
THYROGLOB AB SERPL-ACNC: <20 IU/ML
THYROPEROXIDASE AB SERPL IA-ACNC: 24.8 IU/ML
TSH SERPL-ACNC: 1.67 UIU/ML

## 2023-08-19 NOTE — SOCIAL HISTORY
[Mother] : mother [___ Sisters] : [unfilled] sisters [College] : College [Sexually Active] : patient is sexually active [de-identified] : Father lives in [place] due to work [FreeTextEntry1] : Attends [employer], finished first year, studying business management

## 2023-08-19 NOTE — CONSULT LETTER
[Dear  ___] : Dear  [unfilled], [Courtesy Letter:] : I had the pleasure of seeing your patient, [unfilled], in my office today. [Please see my note below.] : Please see my note below. [Consult Closing:] : Thank you very much for allowing me to participate in the care of this patient.  If you have any questions, please do not hesitate to contact me. [Sincerely,] : Sincerely, [FreeTextEntry2] : Dr. Eugenio Hills\par [address] [place] [address], [address]\[place] [id number] [FreeTextEntry3] : Dacia López MD \par Pediatric Rheumatology Fellow \par [facility]

## 2023-08-19 NOTE — END OF VISIT
[] : Fellow [FreeTextEntry3] : No complaints today and no new findings on exam.  reviewed her current medications.  Stable on current treatment.\par \par I discussed this patient in a pre-clinic session with the fellow including review of clinical status, prior notes and last labs.  I also saw the patient and discussed history, completed an exam and discussed the plan together with the patient/family and fellow.  Total time spent today on this patient 40 minutes.

## 2023-08-19 NOTE — HISTORY OF PRESENT ILLNESS
[VANNESSA] : VANNESSA [ds-DNA] : ds-DNA [Sm] : Sm [RNP] : RNP [SSA] : SSA [Yes] : The patient is using sunscreen [Glomerulonephritis] : glomerulonephritis [Noncontributory] : The patient's family history was noncontributory [Unlimited ADLs] : able to do activities of daily living without limitations [FreeTextEntry1] : Domonique presents for follow-up today. \par \par Domonique is feeling well overall but complaining of some right hand pain and puffiness for 1 week - she felt it was related to the weather (rainy). This has intermittently happened before SLE diagnosis and would self resolved. No swelling or pain today. \par \par Planning to have a root canal/crown placed in the next month. \par \par Current medications: \par - CellCept 1500 mg PO BID \par -  mg PO daily \par - Lisinopril 30 mg PO daily \par - Vitamin D daily (OTC) \par \par LMP: 2 weeks ago \par \par Using sunscreen consistently. \par \par Finished first year of college - school starts again on August 29. 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 mouth sores, cough, congestion, chest pain, difficulty breathing, nausea, vomiting, diarrhea, constipation, blood in the stool, abdominal pain, dysuria, hematuria, joint pain, peripheral edema, back pain, or rash.  [SLEDXDATE] : 05/20/2022

## 2023-08-19 NOTE — PHYSICAL EXAM
[Normal] : normal [PERRLA] : CAIO [Lips] : normal lips [Oral] : normal oral cavity  [Mucosa] : moist and pink mucosa [Palate] : normal palate [S1, S2 Present] : S1, S2 present [Peripheral Pulses] : positive peripheral pulses [Respiratory Effort] : normal respiratory effort [Clear to auscultation] : clear to auscultation [Soft] : soft [NonTender] : non tender [Non Distended] : non distended [Normal Bowel Sounds] : normal bowel sounds [No Hepatosplenomegaly] : no hepatosplenomegaly [No Abnormal Lymph Nodes Palpated] : no abnormal lymph nodes palpated [Refer to Joint Diagram Below] : refer to joint diagram below [Range Of Motion] : full range of motion [Cranial nerves grossly intact] : cranial nerves grossly intact [Intact Judgement] : intact judgement  [Insight Insight] : intact insight [Not Examined] : not examined [0] : 0 [_______] : Knee: [unfilled] [Acute distress] : no acute distress [Malar Erythema] : no malar erythema [Erythematous Conjunctiva] : nonerythematous conjunctiva [Erythematous Oropharynx] : nonerythematous oropharynx [Ulcers] : no ulcers [Lesions] : no lesions [Induration] : no induration [Erythematous] : not erythematous [Mass (___cm)] : no neck masses [Murmurs] : no murmurs [Peripheral Edema] : no peripheral edema  [Joint effusions] : no joint effusions [FreeTextEntry1] : well-appearing [de-identified] : keratosis pilaris of bilateral arms [de-identified] : no active arthritis [NumbJointsActiveArthritis] : 0

## 2023-09-19 ENCOUNTER — APPOINTMENT (OUTPATIENT)
Dept: PEDIATRIC NEPHROLOGY | Facility: CLINIC | Age: 18
End: 2023-09-19
Payer: MEDICAID

## 2023-09-19 VITALS
DIASTOLIC BLOOD PRESSURE: 65 MMHG | TEMPERATURE: 98.4 F | HEIGHT: 62 IN | WEIGHT: 138.56 LBS | SYSTOLIC BLOOD PRESSURE: 98 MMHG | BODY MASS INDEX: 25.5 KG/M2 | HEART RATE: 84 BPM

## 2023-09-19 DIAGNOSIS — Z79.52 LONG TERM (CURRENT) USE OF SYSTEMIC STEROIDS: ICD-10-CM

## 2023-09-19 DIAGNOSIS — R03.0 ELEVATED BLOOD-PRESSURE READING, W/OUT DIAGNOSIS OF HYPERTENSION: ICD-10-CM

## 2023-09-19 PROCEDURE — 99214 OFFICE O/P EST MOD 30 MIN: CPT | Mod: 25

## 2023-09-19 PROCEDURE — 81003 URINALYSIS AUTO W/O SCOPE: CPT | Mod: QW

## 2023-09-20 LAB
25(OH)D3 SERPL-MCNC: 18.2 NG/ML
ALBUMIN SERPL ELPH-MCNC: 4.2 G/DL
ALP BLD-CCNC: 57 U/L
ALT SERPL-CCNC: 9 U/L
ANION GAP SERPL CALC-SCNC: 12 MMOL/L
APPEARANCE: CLEAR
AST SERPL-CCNC: 13 U/L
BACTERIA: NEGATIVE /HPF
BILIRUB SERPL-MCNC: 0.3 MG/DL
BILIRUBIN URINE: NEGATIVE
BLOOD URINE: ABNORMAL
BUN SERPL-MCNC: 12 MG/DL
CALCIUM SERPL-MCNC: 9.6 MG/DL
CALCIUM SERPL-MCNC: 9.6 MG/DL
CAST: 0 /LPF
CHLORIDE SERPL-SCNC: 103 MMOL/L
CO2 SERPL-SCNC: 22 MMOL/L
COLOR: YELLOW
CREAT SERPL-MCNC: 0.38 MG/DL
CREAT SPEC-SCNC: 117 MG/DL
CREAT/PROT UR: 0.1 RATIO
CRP SERPL-MCNC: <3 MG/L
EGFR: 147 ML/MIN/1.73M2
EPITHELIAL CELLS: 2 /HPF
ERYTHROCYTE [SEDIMENTATION RATE] IN BLOOD BY WESTERGREN METHOD: 11 MM/HR
FERRITIN SERPL-MCNC: 64 NG/ML
GLUCOSE QUALITATIVE U: NEGATIVE MG/DL
GLUCOSE SERPL-MCNC: 90 MG/DL
IRON SATN MFR SERPL: 20 %
IRON SERPL-MCNC: 71 UG/DL
KETONES URINE: NEGATIVE MG/DL
LEUKOCYTE ESTERASE URINE: NEGATIVE
MICROSCOPIC-UA: NORMAL
NITRITE URINE: NEGATIVE
PARATHYROID HORMONE INTACT: 26 PG/ML
PH URINE: 6
PHOSPHATE SERPL-MCNC: 4.2 MG/DL
POTASSIUM SERPL-SCNC: 4.5 MMOL/L
PROT SERPL-MCNC: 6.9 G/DL
PROT UR-MCNC: 11 MG/DL
PROTEIN URINE: NEGATIVE MG/DL
RED BLOOD CELLS URINE: 14 /HPF
SODIUM SERPL-SCNC: 137 MMOL/L
SPECIFIC GRAVITY URINE: 1.03
TIBC SERPL-MCNC: 356 UG/DL
TRANSFERRIN SERPL-MCNC: 263 MG/DL
UIBC SERPL-MCNC: 285 UG/DL
UROBILINOGEN URINE: 0.2 MG/DL
WHITE BLOOD CELLS URINE: 0 /HPF

## 2023-09-21 LAB
BASOPHILS # BLD AUTO: 0.03 K/UL
BASOPHILS NFR BLD AUTO: 0.6 %
C3 SERPL-MCNC: 109 MG/DL
C4 SERPL-MCNC: 19 MG/DL
DSDNA AB SER-ACNC: 36 IU/ML
EOSINOPHIL # BLD AUTO: 0.18 K/UL
EOSINOPHIL NFR BLD AUTO: 3.4 %
HCT VFR BLD CALC: 39.6 %
HGB BLD-MCNC: 12 G/DL
IMM GRANULOCYTES NFR BLD AUTO: 0.2 %
LYMPHOCYTES # BLD AUTO: 2.13 K/UL
LYMPHOCYTES NFR BLD AUTO: 39.9 %
MAN DIFF?: NORMAL
MCHC RBC-ENTMCNC: 28.6 PG
MCHC RBC-ENTMCNC: 30.3 GM/DL
MCV RBC AUTO: 94.3 FL
MONOCYTES # BLD AUTO: 0.45 K/UL
MONOCYTES NFR BLD AUTO: 8.4 %
NEUTROPHILS # BLD AUTO: 2.54 K/UL
NEUTROPHILS NFR BLD AUTO: 47.5 %
PLATELET # BLD AUTO: 327 K/UL
RBC # BLD: 4.2 M/UL
RBC # FLD: 12.9 %
WBC # FLD AUTO: 5.34 K/UL

## 2023-09-26 ENCOUNTER — APPOINTMENT (OUTPATIENT)
Dept: PEDIATRIC RHEUMATOLOGY | Facility: CLINIC | Age: 18
End: 2023-09-26

## 2023-09-26 PROBLEM — R03.0 ELEVATED BLOOD PRESSURE READING WITHOUT DIAGNOSIS OF HYPERTENSION: Status: RESOLVED | Noted: 2018-10-08 | Resolved: 2023-07-28

## 2023-09-26 PROBLEM — Z79.52 CURRENT CHRONIC USE OF SYSTEMIC STEROIDS: Status: RESOLVED | Noted: 2022-07-07 | Resolved: 2023-06-08

## 2023-10-17 ENCOUNTER — APPOINTMENT (OUTPATIENT)
Dept: PEDIATRIC RHEUMATOLOGY | Facility: CLINIC | Age: 18
End: 2023-10-17
Payer: MEDICAID

## 2023-10-17 ENCOUNTER — LABORATORY RESULT (OUTPATIENT)
Age: 18
End: 2023-10-17

## 2023-10-17 VITALS
HEIGHT: 62.2 IN | WEIGHT: 139 LBS | BODY MASS INDEX: 25.26 KG/M2 | HEART RATE: 80 BPM | DIASTOLIC BLOOD PRESSURE: 70 MMHG | TEMPERATURE: 97.6 F | SYSTOLIC BLOOD PRESSURE: 110 MMHG

## 2023-10-17 LAB
BASOPHILS # BLD AUTO: 0.04 K/UL
BASOPHILS NFR BLD AUTO: 0.7 %
EOSINOPHIL # BLD AUTO: 0.18 K/UL
EOSINOPHIL NFR BLD AUTO: 2.9 %
HCT VFR BLD CALC: 41.3 %
HGB BLD-MCNC: 13.3 G/DL
IMM GRANULOCYTES NFR BLD AUTO: 0.2 %
LYMPHOCYTES # BLD AUTO: 2.12 K/UL
LYMPHOCYTES NFR BLD AUTO: 34.6 %
MAN DIFF?: NORMAL
MCHC RBC-ENTMCNC: 28.1 PG
MCHC RBC-ENTMCNC: 32.2 GM/DL
MCV RBC AUTO: 87.3 FL
MONOCYTES # BLD AUTO: 0.63 K/UL
MONOCYTES NFR BLD AUTO: 10.3 %
NEUTROPHILS # BLD AUTO: 3.14 K/UL
NEUTROPHILS NFR BLD AUTO: 51.3 %
PLATELET # BLD AUTO: 335 K/UL
RBC # BLD: 4.73 M/UL
RBC # FLD: 11.9 %
WBC # FLD AUTO: 6.12 K/UL

## 2023-10-17 PROCEDURE — 99214 OFFICE O/P EST MOD 30 MIN: CPT

## 2023-10-18 LAB
25(OH)D3 SERPL-MCNC: 32.6 NG/ML
ALBUMIN SERPL ELPH-MCNC: 4.5 G/DL
ALP BLD-CCNC: 69 U/L
ALT SERPL-CCNC: 10 U/L
ANION GAP SERPL CALC-SCNC: 10 MMOL/L
APPEARANCE: CLEAR
AST SERPL-CCNC: 16 U/L
BILIRUB SERPL-MCNC: 0.3 MG/DL
BILIRUBIN URINE: NEGATIVE
BLOOD URINE: ABNORMAL
BUN SERPL-MCNC: 11 MG/DL
C3 SERPL-MCNC: 117 MG/DL
C4 SERPL-MCNC: 18 MG/DL
CALCIUM SERPL-MCNC: 10.4 MG/DL
CHLORIDE SERPL-SCNC: 101 MMOL/L
CO2 SERPL-SCNC: 27 MMOL/L
COLOR: YELLOW
CREAT SERPL-MCNC: 0.57 MG/DL
CREAT SPEC-SCNC: 81 MG/DL
CREAT/PROT UR: 0.1 RATIO
CRP SERPL-MCNC: <3 MG/L
DSDNA AB SER-ACNC: 31 IU/ML
EGFR: 133 ML/MIN/1.73M2
ENA RNP AB SER IA-ACNC: >8 AL
ENA SM AB SER IA-ACNC: >8 AL
ERYTHROCYTE [SEDIMENTATION RATE] IN BLOOD BY WESTERGREN METHOD: 5 MM/HR
GLUCOSE QUALITATIVE U: NEGATIVE MG/DL
GLUCOSE SERPL-MCNC: 90 MG/DL
KETONES URINE: NEGATIVE MG/DL
LEUKOCYTE ESTERASE URINE: NEGATIVE
NITRITE URINE: NEGATIVE
PH URINE: 6.5
POTASSIUM SERPL-SCNC: 4.6 MMOL/L
PROT SERPL-MCNC: 7.5 G/DL
PROT UR-MCNC: 10 MG/DL
PROTEIN URINE: NEGATIVE MG/DL
SODIUM SERPL-SCNC: 138 MMOL/L
SPECIFIC GRAVITY URINE: 1.02
TSH SERPL-ACNC: 1.06 UIU/ML
UROBILINOGEN URINE: 0.2 MG/DL

## 2023-10-25 RX ORDER — LISINOPRIL 30 MG/1
30 TABLET ORAL DAILY
Qty: 30 | Refills: 5 | Status: DISCONTINUED | COMMUNITY
Start: 2022-10-20 | End: 2023-10-25

## 2023-10-27 ENCOUNTER — TRANSCRIPTION ENCOUNTER (OUTPATIENT)
Age: 18
End: 2023-10-27

## 2023-11-01 ENCOUNTER — NON-APPOINTMENT (OUTPATIENT)
Age: 18
End: 2023-11-01

## 2023-11-27 RX ORDER — LISINOPRIL 10 MG/1
10 TABLET ORAL
Qty: 30 | Refills: 5 | Status: ACTIVE | COMMUNITY
Start: 2023-10-25 | End: 1900-01-01

## 2023-11-27 RX ORDER — LISINOPRIL 5 MG/1
5 TABLET ORAL DAILY
Qty: 30 | Refills: 5 | Status: ACTIVE | COMMUNITY
Start: 2023-10-25 | End: 1900-01-01

## 2024-01-02 ENCOUNTER — APPOINTMENT (OUTPATIENT)
Dept: PEDIATRIC RHEUMATOLOGY | Facility: CLINIC | Age: 19
End: 2024-01-02
Payer: MEDICAID

## 2024-01-02 ENCOUNTER — LABORATORY RESULT (OUTPATIENT)
Age: 19
End: 2024-01-02

## 2024-01-02 VITALS
BODY MASS INDEX: 25.98 KG/M2 | SYSTOLIC BLOOD PRESSURE: 120 MMHG | TEMPERATURE: 98 F | WEIGHT: 142.99 LBS | HEIGHT: 62.2 IN | DIASTOLIC BLOOD PRESSURE: 71 MMHG | HEART RATE: 94 BPM

## 2024-01-02 LAB
25(OH)D3 SERPL-MCNC: 18.7 NG/ML
ALBUMIN SERPL ELPH-MCNC: 4.7 G/DL
ALP BLD-CCNC: 64 U/L
ALT SERPL-CCNC: 15 U/L
ANION GAP SERPL CALC-SCNC: 10 MMOL/L
APPEARANCE: CLEAR
AST SERPL-CCNC: 17 U/L
BASOPHILS # BLD AUTO: 0.04 K/UL
BASOPHILS NFR BLD AUTO: 0.8 %
BILIRUB SERPL-MCNC: 0.6 MG/DL
BILIRUBIN URINE: NEGATIVE
BLOOD URINE: ABNORMAL
BUN SERPL-MCNC: 11 MG/DL
C3 SERPL-MCNC: 111 MG/DL
C4 SERPL-MCNC: 16 MG/DL
CALCIUM SERPL-MCNC: 10 MG/DL
CHLORIDE SERPL-SCNC: 101 MMOL/L
CO2 SERPL-SCNC: 26 MMOL/L
COLOR: YELLOW
CREAT SERPL-MCNC: 0.47 MG/DL
CRP SERPL-MCNC: <3 MG/L
EGFR: 140 ML/MIN/1.73M2
EOSINOPHIL # BLD AUTO: 0.11 K/UL
EOSINOPHIL NFR BLD AUTO: 2.2 %
GLUCOSE QUALITATIVE U: NEGATIVE MG/DL
GLUCOSE SERPL-MCNC: 94 MG/DL
HCT VFR BLD CALC: 40.6 %
HGB BLD-MCNC: 13.3 G/DL
IMM GRANULOCYTES NFR BLD AUTO: 0.2 %
KETONES URINE: NEGATIVE MG/DL
LEUKOCYTE ESTERASE URINE: NEGATIVE
LYMPHOCYTES # BLD AUTO: 1.87 K/UL
LYMPHOCYTES NFR BLD AUTO: 38.2 %
MAN DIFF?: NORMAL
MCHC RBC-ENTMCNC: 28.2 PG
MCHC RBC-ENTMCNC: 32.8 GM/DL
MCV RBC AUTO: 86.2 FL
MONOCYTES # BLD AUTO: 0.46 K/UL
MONOCYTES NFR BLD AUTO: 9.4 %
NEUTROPHILS # BLD AUTO: 2.4 K/UL
NEUTROPHILS NFR BLD AUTO: 49.2 %
NITRITE URINE: NEGATIVE
PH URINE: 5.5
PLATELET # BLD AUTO: 313 K/UL
POTASSIUM SERPL-SCNC: 4.4 MMOL/L
PROT SERPL-MCNC: 7.5 G/DL
PROTEIN URINE: NEGATIVE MG/DL
RBC # BLD: 4.71 M/UL
RBC # FLD: 12 %
SODIUM SERPL-SCNC: 137 MMOL/L
SPECIFIC GRAVITY URINE: 1.02
TSH SERPL-ACNC: 1.27 UIU/ML
UROBILINOGEN URINE: 0.2 MG/DL
WBC # FLD AUTO: 4.89 K/UL

## 2024-01-02 PROCEDURE — 99215 OFFICE O/P EST HI 40 MIN: CPT

## 2024-01-02 NOTE — HISTORY OF PRESENT ILLNESS
[VANNESSA] : VANNESSA [ds-DNA] : ds-DNA [Sm] : Sm [RNP] : RNP [SSA] : SSA [Yes] : The patient is using sunscreen [Glomerulonephritis] : glomerulonephritis [Noncontributory] : The patient's family history was noncontributory [Unlimited ADLs] : able to do activities of daily living without limitations [FreeTextEntry1] : Domonique presents for follow-up today.   She reports feeling well overall - no fevers, rashes, oral ulcers, hair loss, joint pain or edema. Eating and drinking well - gained a few pounds since last visit. Is not regularly exercising. Urinating normally.  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 Domonique has told me that she plans to give a She has recently moved home (previously living with girlfriend) as her grandparents have come from [place] to visit. Recently went to [place] with friends.   Current medications:  - CellCept 1500 mg PO BID  -  mg PO daily  - Lisinopril 15 mg PO daily  LMP: Dec 10  Using sunscreen consistently.   Saw ophthalmology on 9/2023 - Domonique reports everything was stable/normal, no report in chart, she plans to follow-up in 6 months   No fever, headache, visual changes, dizziness. mouth sores, cough, congestion, chest pain, difficulty breathing, nausea, vomiting, diarrhea, constipation, blood in the stool, abdominal pain, dysuria, hematuria, joint pain, peripheral edema, back pain, or rash.  [SLEDXDATE] : 05/20/2022 [[facility]] : [facility]

## 2024-01-02 NOTE — PHYSICAL EXAM
[Normal] : normal [PERRLA] : CAIO [Lips] : normal lips [Oral] : normal oral cavity  [Mucosa] : moist and pink mucosa [Palate] : normal palate [S1, S2 Present] : S1, S2 present [Peripheral Pulses] : positive peripheral pulses [Respiratory Effort] : normal respiratory effort [Clear to auscultation] : clear to auscultation [Soft] : soft [NonTender] : non tender [Non Distended] : non distended [Normal Bowel Sounds] : normal bowel sounds [No Hepatosplenomegaly] : no hepatosplenomegaly [No Abnormal Lymph Nodes Palpated] : no abnormal lymph nodes palpated [Refer to Joint Diagram Below] : refer to joint diagram below [Range Of Motion] : full range of motion [Cranial nerves grossly intact] : cranial nerves grossly intact [Intact Judgement] : intact judgement  [Insight Insight] : intact insight [Not Examined] : not examined [0] : 0 [_______] : Knee: [unfilled] [Acute distress] : no acute distress [Malar Erythema] : no malar erythema [Erythematous Conjunctiva] : nonerythematous conjunctiva [Erythematous Oropharynx] : nonerythematous oropharynx [Ulcers] : no ulcers [Lesions] : no lesions [Induration] : no induration [Erythematous] : not erythematous [Mass (___cm)] : no neck masses [Murmurs] : no murmurs [Peripheral Edema] : no peripheral edema  [Joint effusions] : no joint effusions [FreeTextEntry1] : well-appearing [de-identified] : keratosis pilaris on bilateral upper arms  [de-identified] : no active arthritis [NumbJointsActiveArthritis] : 0

## 2024-01-02 NOTE — SOCIAL HISTORY
[Mother] : mother [Grandparent(s)] : grandparent(s) [___ Sisters] : [unfilled] sisters [College] : College [Sexually Active] : patient is sexually active [de-identified] : Father lives in [place] due to work [FreeTextEntry1] : Attends [employer],,sophomore year, studying business management

## 2024-01-02 NOTE — CONSULT LETTER
[Dear  ___] : Dear  [unfilled], [Courtesy Letter:] : I had the pleasure of seeing your patient, [unfilled], in my office today. [Please see my note below.] : Please see my note below. [Consult Closing:] : Thank you very much for allowing me to participate in the care of this patient.  If you have any questions, please do not hesitate to contact me. [Sincerely,] : Sincerely, [FreeTextEntry2] : Dr. Eugenio Hills\par  [address] [place] [address], [address]\[place] [id number] [FreeTextEntry3] : Dacai López MD Attending Physician, Pediatric Rheumatology [facility] | [facility]

## 2024-01-03 LAB
CARDIOLIPIN AB SER IA-ACNC: NEGATIVE
CONFIRM: 28.9 SEC
CREAT SPEC-SCNC: 90 MG/DL
CREAT/PROT UR: 0.1 RATIO
DRVVT IMM 1:2 NP PPP: NORMAL
DRVVT SCREEN TO CONFIRM RATIO: 0.96 RATIO
ENA RNP AB SER IA-ACNC: >8 AL
ENA SM AB SER IA-ACNC: >8 AL
ENA SS-A AB SER IA-ACNC: 0.2 AL
ENA SS-B AB SER IA-ACNC: <0.2 AL
ERYTHROCYTE [SEDIMENTATION RATE] IN BLOOD BY WESTERGREN METHOD: 4 MM/HR
PROT UR-MCNC: 7 MG/DL
SCREEN DRVVT: 32.5 SEC

## 2024-01-04 LAB
B2 GLYCOPROT1 IGA SERPL IA-ACNC: <5 SAU
B2 GLYCOPROT1 IGG SER-ACNC: <5 SGU
B2 GLYCOPROT1 IGM SER-ACNC: 13.6 SMU
CARDIOLIPIN IGM SER-MCNC: 6.9 MPL
CARDIOLIPIN IGM SER-MCNC: <5 GPL
DSDNA AB SER-ACNC: 20 IU/ML

## 2024-02-13 ENCOUNTER — APPOINTMENT (OUTPATIENT)
Dept: PEDIATRIC NEPHROLOGY | Facility: CLINIC | Age: 19
End: 2024-02-13
Payer: MEDICAID

## 2024-02-13 ENCOUNTER — APPOINTMENT (OUTPATIENT)
Dept: PEDIATRIC NEPHROLOGY | Facility: CLINIC | Age: 19
End: 2024-02-13

## 2024-02-13 DIAGNOSIS — D84.9 IMMUNODEFICIENCY, UNSPECIFIED: ICD-10-CM

## 2024-02-13 DIAGNOSIS — R31.9 HEMATURIA, UNSPECIFIED: ICD-10-CM

## 2024-02-13 PROCEDURE — 99214 OFFICE O/P EST MOD 30 MIN: CPT

## 2024-02-13 RX ORDER — AMOXICILLIN 500 MG/1
500 TABLET, FILM COATED ORAL
Qty: 4 | Refills: 2 | Status: DISCONTINUED | COMMUNITY
Start: 2022-07-07 | End: 2024-02-13

## 2024-02-16 NOTE — CONSULT LETTER
[Consult Letter:] : I had the pleasure of evaluating your patient, [unfilled]. [Please see my note below.] : Please see my note below. [Consult Closing:] : Thank you very much for allowing me to participate in the care of this patient.  If you have any questions, please do not hesitate to contact me. [Sincerely,] : Sincerely, [FreeTextEntry3] : Antoinette Jacobson MD MSc Pediatric Nephrology [facility]  [phone]

## 2024-02-16 NOTE — REASON FOR VISIT
[Follow-Up] : a follow-up visit for [FreeTextEntry3] : Lupus Nephritis  Melolabial Transposition Flap Text: The defect edges were debeveled with a #15 scalpel blade.  Given the location of the defect and the proximity to free margins a melolabial flap was deemed most appropriate.  Using a sterile surgical marker, an appropriate melolabial transposition flap was drawn incorporating the defect.    The area thus outlined was incised deep to adipose tissue with a #15 scalpel blade.  The skin margins were undermined to an appropriate distance in all directions utilizing iris scissors.

## 2024-03-12 ENCOUNTER — LABORATORY RESULT (OUTPATIENT)
Age: 19
End: 2024-03-12

## 2024-03-12 ENCOUNTER — APPOINTMENT (OUTPATIENT)
Dept: PEDIATRIC RHEUMATOLOGY | Facility: CLINIC | Age: 19
End: 2024-03-12
Payer: MEDICAID

## 2024-03-12 VITALS
HEART RATE: 96 BPM | DIASTOLIC BLOOD PRESSURE: 78 MMHG | SYSTOLIC BLOOD PRESSURE: 118 MMHG | WEIGHT: 140.44 LBS | TEMPERATURE: 97.9 F | HEIGHT: 62.44 IN | BODY MASS INDEX: 25.2 KG/M2

## 2024-03-12 DIAGNOSIS — R80.1 PERSISTENT PROTEINURIA, UNSPECIFIED: ICD-10-CM

## 2024-03-12 LAB
25(OH)D3 SERPL-MCNC: 16.6 NG/ML
ALBUMIN SERPL ELPH-MCNC: 4.5 G/DL
ALP BLD-CCNC: 66 U/L
ALT SERPL-CCNC: 10 U/L
ANION GAP SERPL CALC-SCNC: 11 MMOL/L
APPEARANCE: CLEAR
AST SERPL-CCNC: 16 U/L
BASOPHILS # BLD AUTO: 0.04 K/UL
BASOPHILS NFR BLD AUTO: 0.7 %
BILIRUB SERPL-MCNC: 0.5 MG/DL
BILIRUBIN URINE: NEGATIVE
BLOOD URINE: ABNORMAL
BUN SERPL-MCNC: 12 MG/DL
CALCIUM SERPL-MCNC: 9.5 MG/DL
CHLORIDE SERPL-SCNC: 105 MMOL/L
CO2 SERPL-SCNC: 25 MMOL/L
COLOR: YELLOW
CREAT SERPL-MCNC: 0.58 MG/DL
CRP SERPL-MCNC: <3 MG/L
EGFR: 133 ML/MIN/1.73M2
EOSINOPHIL # BLD AUTO: 0.13 K/UL
EOSINOPHIL NFR BLD AUTO: 2.4 %
ERYTHROCYTE [SEDIMENTATION RATE] IN BLOOD BY WESTERGREN METHOD: 5 MM/HR
GLUCOSE QUALITATIVE U: NEGATIVE MG/DL
GLUCOSE SERPL-MCNC: 88 MG/DL
HCT VFR BLD CALC: 38 %
HGB BLD-MCNC: 12.4 G/DL
IMM GRANULOCYTES NFR BLD AUTO: 0.2 %
KETONES URINE: NEGATIVE MG/DL
LEUKOCYTE ESTERASE URINE: NEGATIVE
LYMPHOCYTES # BLD AUTO: 2.25 K/UL
LYMPHOCYTES NFR BLD AUTO: 41.8 %
MAN DIFF?: NORMAL
MCHC RBC-ENTMCNC: 28.6 PG
MCHC RBC-ENTMCNC: 32.6 GM/DL
MCV RBC AUTO: 87.8 FL
MONOCYTES # BLD AUTO: 0.53 K/UL
MONOCYTES NFR BLD AUTO: 9.9 %
NEUTROPHILS # BLD AUTO: 2.42 K/UL
NEUTROPHILS NFR BLD AUTO: 45 %
NITRITE URINE: NEGATIVE
PH URINE: 5.5
PLATELET # BLD AUTO: 272 K/UL
POTASSIUM SERPL-SCNC: 4 MMOL/L
PROT SERPL-MCNC: 7.3 G/DL
PROTEIN URINE: NORMAL MG/DL
RBC # BLD: 4.33 M/UL
RBC # FLD: 12.2 %
SODIUM SERPL-SCNC: 141 MMOL/L
SPECIFIC GRAVITY URINE: 1.03
TSH SERPL-ACNC: 1.25 UIU/ML
UROBILINOGEN URINE: 0.2 MG/DL
WBC # FLD AUTO: 5.38 K/UL

## 2024-03-12 PROCEDURE — 99215 OFFICE O/P EST HI 40 MIN: CPT

## 2024-03-12 PROCEDURE — G2211 COMPLEX E/M VISIT ADD ON: CPT | Mod: NC,1L

## 2024-03-12 RX ORDER — HYDROXYCHLOROQUINE SULFATE 200 MG/1
200 TABLET, FILM COATED ORAL
Qty: 90 | Refills: 1 | Status: ACTIVE | COMMUNITY
Start: 2022-06-02 | End: 1900-01-01

## 2024-03-12 NOTE — HISTORY OF PRESENT ILLNESS
[ds-DNA] : ds-DNA [VANNESSA] : VANNESSA [Sm] : Sm [SSA] : SSA [RNP] : RNP [Yes] : The patient is using sunscreen [Glomerulonephritis] : glomerulonephritis [Noncontributory] : The patient's family history was noncontributory [Unlimited ADLs] : able to do activities of daily living without limitations [FreeTextEntry1] : Domonique presents for follow-up today.   She reports feeling well overall - no fevers, rashes, oral ulcers, hair loss, joint pain or edema. Eating and drinking well. Urinating normally. Had one day of right hand swelling/pain/limitation with the snowstorm, but it resolved the next day. She reports that her mother and sister tested positive for H. pylori through routine screening with their PCP and Domonique went and got tested - tested positive for H. pylori and currently taking omeprazole, amoxicillin, and clarithromycin for total 2 weeks.   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 She was previously living with girlfriend Ariane, but now living at home since grandparents are visiting from [place]. She plans to travel to [facility] at the end of April for a trip with her girlfriend.   Current medications:  - CellCept 1500 mg PO BID  -  mg PO daily  - Lisinopril 15 mg PO daily  LMP: 1 month ago - late (thinks it is due to midterm stress)   Using sunscreen consistently.   Saw ophthalmology on 9/2023 - Domonique reports everything was stable/normal, no report in chart, she plans to follow-up in 6 months   No fever, headache, visual changes, dizziness. mouth sores, cough, congestion, chest pain, difficulty breathing, nausea, vomiting, diarrhea, constipation, blood in the stool, abdominal pain, dysuria, hematuria, joint pain, peripheral edema, back pain, or rash.  [SLEDXDATE] : 05/20/2022 [[facility]] : 09/13/2023

## 2024-03-12 NOTE — CONSULT LETTER
[Dear  ___] : Dear  [unfilled], [Courtesy Letter:] : I had the pleasure of seeing your patient, [unfilled], in my office today. [Please see my note below.] : Please see my note below. [Consult Closing:] : Thank you very much for allowing me to participate in the care of this patient.  If you have any questions, please do not hesitate to contact me. [Sincerely,] : Sincerely, [FreeTextEntry2] : Dr. Eugenio Hills\par  [address] [place] [address], [address]\[place] [id number] [FreeTextEntry3] : Dacia López MD Attending Physician, Pediatric Rheumatology [facility] | [facility]

## 2024-03-12 NOTE — SOCIAL HISTORY
[Mother] : mother [Grandparent(s)] : grandparent(s) [___ Sisters] : [unfilled] sisters [College] : College [Sexually Active] : patient is sexually active [de-identified] : Father lives in [place] due to work [FreeTextEntry1] : Attends [employer],,sophomore year, studying business management

## 2024-03-12 NOTE — PHYSICAL EXAM
[Normal] : normal [PERRLA] : CAIO [Lips] : normal lips [Oral] : normal oral cavity  [Palate] : normal palate [Mucosa] : moist and pink mucosa [S1, S2 Present] : S1, S2 present [Peripheral Pulses] : positive peripheral pulses [Respiratory Effort] : normal respiratory effort [Clear to auscultation] : clear to auscultation [Soft] : soft [NonTender] : non tender [Non Distended] : non distended [Normal Bowel Sounds] : normal bowel sounds [No Hepatosplenomegaly] : no hepatosplenomegaly [Refer to Joint Diagram Below] : refer to joint diagram below [No Abnormal Lymph Nodes Palpated] : no abnormal lymph nodes palpated [Range Of Motion] : full range of motion [Intact Judgement] : intact judgement  [Cranial nerves grossly intact] : cranial nerves grossly intact [Insight Insight] : intact insight [Not Examined] : not examined [0] : 0 [_______] : Knee: [unfilled] [Acute distress] : no acute distress [Malar Erythema] : no malar erythema [Erythematous Conjunctiva] : nonerythematous conjunctiva [Erythematous Oropharynx] : nonerythematous oropharynx [Ulcers] : no ulcers [Lesions] : no lesions [Induration] : no induration [Erythematous] : not erythematous [Mass (___cm)] : no neck masses [Murmurs] : no murmurs [Peripheral Edema] : no peripheral edema  [Joint effusions] : no joint effusions [FreeTextEntry1] : well-appearing [de-identified] : keratosis pilaris on bilateral upper arms  [de-identified] : no active arthritis [NumbJointsActiveArthritis] : 0

## 2024-03-12 NOTE — REVIEW OF SYSTEMS
LAST SEEN 5/08/23  Former Dr Mikael Diaz patient [NI] : Endocrine [Nl] : Hematologic/Lymphatic [Edema] : no edema

## 2024-03-12 NOTE — PHYSICAL EXAM
[Normal] : normal [PERRLA] : CAIO [Lips] : normal lips [Oral] : normal oral cavity  [Palate] : normal palate [Mucosa] : moist and pink mucosa [S1, S2 Present] : S1, S2 present [Peripheral Pulses] : positive peripheral pulses [Clear to auscultation] : clear to auscultation [Respiratory Effort] : normal respiratory effort [Soft] : soft [NonTender] : non tender [Non Distended] : non distended [No Hepatosplenomegaly] : no hepatosplenomegaly [Normal Bowel Sounds] : normal bowel sounds [No Abnormal Lymph Nodes Palpated] : no abnormal lymph nodes palpated [Refer to Joint Diagram Below] : refer to joint diagram below [Range Of Motion] : full range of motion [Cranial nerves grossly intact] : cranial nerves grossly intact [Intact Judgement] : intact judgement  [Insight Insight] : intact insight [Not Examined] : not examined [0] : 0 [_______] : Knee: [unfilled] [Acute distress] : no acute distress [Malar Erythema] : no malar erythema [Erythematous Conjunctiva] : nonerythematous conjunctiva [Ulcers] : no ulcers [Erythematous Oropharynx] : nonerythematous oropharynx [Lesions] : no lesions [Induration] : no induration [Erythematous] : not erythematous [Mass (___cm)] : no neck masses [Murmurs] : no murmurs [Peripheral Edema] : no peripheral edema  [Joint effusions] : no joint effusions [FreeTextEntry1] : well-appearing [de-identified] : keratosis pilaris on bilateral upper arms  [de-identified] : no active arthritis [NumbJointsActiveArthritis] : 0

## 2024-03-12 NOTE — HISTORY OF PRESENT ILLNESS
[VANNESSA] : VANNESSA [ds-DNA] : ds-DNA [Sm] : Sm [SSA] : SSA [RNP] : RNP [Yes] : The patient is using sunscreen [Glomerulonephritis] : glomerulonephritis [Noncontributory] : The patient's family history was noncontributory [Unlimited ADLs] : able to do activities of daily living without limitations [FreeTextEntry1] : 1. Systemic lupus erythematosus (SLE) - diagnosed 5/2022 [periorbital/peripheral edema, PIP/MCP arthritis, +VANNESSA, dsDNA, hypocomplementemia, +Smith, +RNP, +SS-A, hematuria/proteinuria] 2. 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 Hashimoto's thyroiditis  Disease history:  - 6/2022: Started oral steroids and MMF for LN  - 4/2023: Off oral steroids .......................................................... [SLEDXDATE] : 05/20/2022 [[facility]] : [facility]

## 2024-03-12 NOTE — SOCIAL HISTORY
[Mother] : mother [Grandparent(s)] : grandparent(s) [___ Sisters] : [unfilled] sisters [College] : College [Sexually Active] : patient is sexually active [de-identified] : Father lives in [place] due to work [FreeTextEntry1] : Attends [employer],,sophomore year, studying business management

## 2024-03-12 NOTE — CONSULT LETTER
[Dear  ___] : Dear  [unfilled], [Courtesy Letter:] : I had the pleasure of seeing your patient, [unfilled], in my office today. [Please see my note below.] : Please see my note below. [Consult Closing:] : Thank you very much for allowing me to participate in the care of this patient.  If you have any questions, please do not hesitate to contact me. [Sincerely,] : Sincerely, [FreeTextEntry2] : Dr. Eugenio Hills\par  [address] [place] [address], [address]\[place] [id number] [FreeTextEntry3] : Dacia López MD Attending Physician, Pediatric Rheumatology [facility] | [facility]

## 2024-03-13 LAB
C3 SERPL-MCNC: 105 MG/DL
C4 SERPL-MCNC: 17 MG/DL
CREAT SPEC-SCNC: 124 MG/DL
CREAT/PROT UR: 0.1 RATIO
PROT UR-MCNC: 12 MG/DL

## 2024-03-18 LAB — DSDNA AB SER-ACNC: 32 IU/ML

## 2024-03-18 RX ORDER — ERGOCALCIFEROL 1.25 MG/1
1.25 MG CAPSULE, LIQUID FILLED ORAL
Qty: 8 | Refills: 0 | Status: ACTIVE | COMMUNITY
Start: 2023-09-21 | End: 1900-01-01

## 2024-05-07 ENCOUNTER — APPOINTMENT (OUTPATIENT)
Dept: PEDIATRIC RHEUMATOLOGY | Facility: CLINIC | Age: 19
End: 2024-05-07
Payer: MEDICAID

## 2024-05-07 VITALS
TEMPERATURE: 97.8 F | HEIGHT: 62.6 IN | BODY MASS INDEX: 26.37 KG/M2 | WEIGHT: 146.98 LBS | SYSTOLIC BLOOD PRESSURE: 112 MMHG | DIASTOLIC BLOOD PRESSURE: 75 MMHG | HEART RATE: 97 BPM

## 2024-05-07 DIAGNOSIS — M32.9 SYSTEMIC LUPUS ERYTHEMATOSUS, UNSPECIFIED: ICD-10-CM

## 2024-05-07 DIAGNOSIS — M32.14 GLOMERULAR DISEASE IN SYSTEMIC LUPUS ERYTHEMATOSUS: ICD-10-CM

## 2024-05-07 DIAGNOSIS — Z79.899 OTHER LONG TERM (CURRENT) DRUG THERAPY: ICD-10-CM

## 2024-05-07 DIAGNOSIS — E06.3 AUTOIMMUNE THYROIDITIS: ICD-10-CM

## 2024-05-07 DIAGNOSIS — E55.9 VITAMIN D DEFICIENCY, UNSPECIFIED: ICD-10-CM

## 2024-05-07 DIAGNOSIS — Z51.81 ENCOUNTER FOR THERAPEUTIC DRUG LVL MONITORING: ICD-10-CM

## 2024-05-07 DIAGNOSIS — Z71.9 COUNSELING, UNSPECIFIED: ICD-10-CM

## 2024-05-07 LAB
ALBUMIN SERPL ELPH-MCNC: 4.7 G/DL
ALP BLD-CCNC: 62 U/L
ALT SERPL-CCNC: 15 U/L
ANION GAP SERPL CALC-SCNC: 11 MMOL/L
AST SERPL-CCNC: 17 U/L
BASOPHILS # BLD AUTO: 0.02 K/UL
BASOPHILS NFR BLD AUTO: 0.4 %
BILIRUB SERPL-MCNC: 0.5 MG/DL
BUN SERPL-MCNC: 9 MG/DL
CALCIUM SERPL-MCNC: 9.5 MG/DL
CHLORIDE SERPL-SCNC: 103 MMOL/L
CO2 SERPL-SCNC: 26 MMOL/L
CREAT SERPL-MCNC: 0.45 MG/DL
CRP SERPL-MCNC: <3 MG/L
EGFR: 141 ML/MIN/1.73M2
EOSINOPHIL # BLD AUTO: 0.19 K/UL
EOSINOPHIL NFR BLD AUTO: 3.5 %
ERYTHROCYTE [SEDIMENTATION RATE] IN BLOOD BY WESTERGREN METHOD: 6 MM/HR
GLUCOSE SERPL-MCNC: 94 MG/DL
HCT VFR BLD CALC: 39.7 %
HGB BLD-MCNC: 12.7 G/DL
IMM GRANULOCYTES NFR BLD AUTO: 0.2 %
LYMPHOCYTES # BLD AUTO: 1.92 K/UL
LYMPHOCYTES NFR BLD AUTO: 35.4 %
MAN DIFF?: NORMAL
MCHC RBC-ENTMCNC: 29.2 PG
MCHC RBC-ENTMCNC: 32 GM/DL
MCV RBC AUTO: 91.3 FL
MONOCYTES # BLD AUTO: 0.7 K/UL
MONOCYTES NFR BLD AUTO: 12.9 %
NEUTROPHILS # BLD AUTO: 2.59 K/UL
NEUTROPHILS NFR BLD AUTO: 47.6 %
PLATELET # BLD AUTO: 291 K/UL
POTASSIUM SERPL-SCNC: 4.1 MMOL/L
PROT SERPL-MCNC: 7.4 G/DL
RBC # BLD: 4.35 M/UL
RBC # FLD: 12.2 %
SODIUM SERPL-SCNC: 139 MMOL/L
WBC # FLD AUTO: 5.43 K/UL

## 2024-05-07 PROCEDURE — 99215 OFFICE O/P EST HI 40 MIN: CPT

## 2024-05-07 PROCEDURE — G2211 COMPLEX E/M VISIT ADD ON: CPT | Mod: NC,1L

## 2024-05-08 LAB
C3 SERPL-MCNC: 107 MG/DL
C4 SERPL-MCNC: 16 MG/DL
DSDNA AB SER-ACNC: 15 IU/ML

## 2024-05-09 PROBLEM — E55.9 VITAMIN D DEFICIENCY: Status: ACTIVE | Noted: 2022-09-22

## 2024-05-09 PROBLEM — E06.3 HASHIMOTO'S THYROIDITIS: Status: ACTIVE | Noted: 2018-10-08

## 2024-05-09 PROBLEM — Z51.81 ENCOUNTER FOR MEDICATION MONITORING: Status: ACTIVE | Noted: 2022-08-18

## 2024-05-09 PROBLEM — M32.14 LUPUS NEPHRITIS: Status: ACTIVE | Noted: 2022-06-02

## 2024-05-09 PROBLEM — M32.9 SLE (SYSTEMIC LUPUS ERYTHEMATOSUS): Status: ACTIVE | Noted: 2022-05-18

## 2024-05-09 PROBLEM — Z79.899 ENCOUNTER FOR LONG-TERM CURRENT USE OF HIGH RISK MEDICATION: Status: ACTIVE | Noted: 2022-07-07

## 2024-05-09 PROBLEM — Z71.9 ENCOUNTER FOR EDUCATION: Status: ACTIVE | Noted: 2022-05-20

## 2024-05-09 RX ORDER — MYCOPHENOLATE MOFETIL 500 MG/1
500 TABLET ORAL
Qty: 360 | Refills: 0 | Status: ACTIVE | COMMUNITY
Start: 2022-06-02 | End: 1900-01-01

## 2024-05-09 NOTE — CONSULT LETTER
[Dear  ___] : Dear  [unfilled], [Courtesy Letter:] : I had the pleasure of seeing your patient, [unfilled], in my office today. [Please see my note below.] : Please see my note below. [Consult Closing:] : Thank you very much for allowing me to participate in the care of this patient.  If you have any questions, please do not hesitate to contact me. [Sincerely,] : Sincerely, [FreeTextEntry2] : Dr. Eugenio Hills\par  [address] [place] [address], [address]\[place] [phone] [FreeTextEntry3] : Dacia López MD Attending Physician, Pediatric Rheumatology [facility] | [facility]

## 2024-05-09 NOTE — SOCIAL HISTORY
[Mother] : mother [Grandparent(s)] : grandparent(s) [___ Sisters] : [unfilled] sisters [College] : College [Sexually Active] : patient is sexually active [de-identified] : Father lives in [place] due to work [FreeTextEntry1] : Attends [employer],,sophomore year, studying business management

## 2024-05-09 NOTE — HISTORY OF PRESENT ILLNESS
[VANNESSA] : VANNESSA [ds-DNA] : ds-DNA [Sm] : Sm [RNP] : RNP [SSA] : SSA [Yes] : The patient is using sunscreen [Glomerulonephritis] : glomerulonephritis [Noncontributory] : The patient's family history was noncontributory [Unlimited ADLs] : able to do activities of daily living without limitations [FreeTextEntry1] : Domonique presents for follow-up today.   She is feeling well overall but notes some joint pain/swelling and stiffness a few days ago in her fingers/hand when the weather was rainy. She notes stiffness for 1-2 hours. Did not use any Motrin or Tylenol. She has photos of swelling of her right hand over her wrist/dorsal surface of hand. Recently traveled to [place] during her spring break.   No fevers, rashes, oral ulcers, hair loss, joint pain or edema. Eating and drinking well. Urinating normally.   Current medications:  - CellCept 1000 mg PO BID  -  mg PO daily  - Lisinopril 15 mg PO daily - Vitamin D weekly   LMP: started menses today   Using sunscreen consistently.   Saw ophthalmology on 4/2024 - Domonique reports everything was stable/normal, no report in chart, she plans to follow-up in 6 months   No fever, headache, visual changes, dizziness. mouth sores, cough, congestion, chest pain, difficulty breathing, nausea, vomiting, diarrhea, constipation, blood in the stool, abdominal pain, dysuria, hematuria, joint pain, peripheral edema, back pain, or rash.  [SLEDXDATE] : 05/20/2022 [[facility] : 09/13/2023

## 2024-05-09 NOTE — PHYSICAL EXAM
[Normal] : normal [PERRLA] : CAIO [Lips] : normal lips [Oral] : normal oral cavity  [Mucosa] : moist and pink mucosa [Palate] : normal palate [S1, S2 Present] : S1, S2 present [Peripheral Pulses] : positive peripheral pulses [Respiratory Effort] : normal respiratory effort [Clear to auscultation] : clear to auscultation [Soft] : soft [NonTender] : non tender [Non Distended] : non distended [Normal Bowel Sounds] : normal bowel sounds [No Hepatosplenomegaly] : no hepatosplenomegaly [No Abnormal Lymph Nodes Palpated] : no abnormal lymph nodes palpated [Refer to Joint Diagram Below] : refer to joint diagram below [Range Of Motion] : full range of motion [Cranial nerves grossly intact] : cranial nerves grossly intact [Intact Judgement] : intact judgement  [Insight Insight] : intact insight [Not Examined] : not examined [0] : 0 [_______] : Knee: [unfilled] [Acute distress] : no acute distress [Malar Erythema] : no malar erythema [Erythematous Conjunctiva] : nonerythematous conjunctiva [Erythematous Oropharynx] : nonerythematous oropharynx [Ulcers] : no ulcers [Lesions] : no lesions [Induration] : no induration [Erythematous] : not erythematous [Mass (___cm)] : no neck masses [Murmurs] : no murmurs [Peripheral Edema] : no peripheral edema  [Joint effusions] : no joint effusions [FreeTextEntry1] : well-appearing [de-identified] : keratosis pilaris on bilateral upper arms  [de-identified] : no active arthritis [NumbJointsActiveArthritis] : 0

## 2024-07-03 ENCOUNTER — NON-APPOINTMENT (OUTPATIENT)
Age: 19
End: 2024-07-03

## 2024-07-09 ENCOUNTER — TRANSCRIPTION ENCOUNTER (OUTPATIENT)
Age: 19
End: 2024-07-09

## 2024-08-06 ENCOUNTER — APPOINTMENT (OUTPATIENT)
Dept: PEDIATRIC RHEUMATOLOGY | Facility: CLINIC | Age: 19
End: 2024-08-06

## 2024-08-06 PROCEDURE — 99215 OFFICE O/P EST HI 40 MIN: CPT

## 2024-08-06 PROCEDURE — G2211 COMPLEX E/M VISIT ADD ON: CPT | Mod: NC,1L

## 2024-08-11 NOTE — SOCIAL HISTORY
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

## 2024-08-11 NOTE — PHYSICAL EXAM
[Normal] : normal [PERRLA] : CAIO [Lips] : normal lips [Oral] : normal oral cavity  [Mucosa] : moist and pink mucosa [Palate] : normal palate [S1, S2 Present] : S1, S2 present [Peripheral Pulses] : positive peripheral pulses [Respiratory Effort] : normal respiratory effort [Clear to auscultation] : clear to auscultation [Soft] : soft [NonTender] : non tender [Non Distended] : non distended [Normal Bowel Sounds] : normal bowel sounds [No Hepatosplenomegaly] : no hepatosplenomegaly [No Abnormal Lymph Nodes Palpated] : no abnormal lymph nodes palpated [Refer to Joint Diagram Below] : refer to joint diagram below [Range Of Motion] : full range of motion [Cranial nerves grossly intact] : cranial nerves grossly intact [Intact Judgement] : intact judgement  [Insight Insight] : intact insight [Not Examined] : not examined [0] : 0 [_______] : Knee: [unfilled] [Acute distress] : no acute distress [Malar Erythema] : no malar erythema [Erythematous Oropharynx] : nonerythematous oropharynx [Erythematous Conjunctiva] : nonerythematous conjunctiva [Ulcers] : no ulcers [Lesions] : no lesions [Induration] : no induration [Erythematous] : not erythematous [Mass (___cm)] : no neck masses [Murmurs] : no murmurs [Peripheral Edema] : no peripheral edema  [Joint effusions] : no joint effusions [1] : 1 [de-identified] : keratosis pilaris on bilateral upper arms  [FreeTextEntry1] : well-appearing [de-identified] : no active arthritis [NumbJointsActiveArthritis] : 0

## 2024-08-11 NOTE — HISTORY OF PRESENT ILLNESS
[VANNESSA] : VANNESSA [ds-DNA] : ds-DNA [Sm] : Sm [RNP] : RNP [SSA] : SSA [Yes] : The patient is using sunscreen [Glomerulonephritis] : glomerulonephritis [Noncontributory] : The patient's family history was noncontributory [Unlimited ADLs] : able to do activities of daily living without limitations [FreeTextEntry1] : Domonique presents for follow-up today.   She is doing well overall. No fevers, rashes, oral ulcers, hair loss, joint pain or edema. Eating and drinking well. Urinating normally. Using sunscreen regularly. Recently traveled to [place] in July - no issues during trip.   Current medications:  - CellCept 1000 mg PO BID  -  mg PO daily  - Lisinopril 15 mg PO daily  LMP: about 2 weeks ago   Saw ophthalmology on 4/2024 - Qi reports everything was stable/normal, no report in chart, she plans to follow-up in 6 months   No fever, headache, visual changes, dizziness. mouth sores, cough, congestion, chest pain, difficulty breathing, nausea, vomiting, diarrhea, constipation, blood in the stool, abdominal pain, dysuria, hematuria, joint pain, peripheral edema, back pain, or rash.  [SLEDXDATE] : 05/20/2022 [facility]

## 2024-08-11 NOTE — CONSULT LETTER
[Dear  ___] : Dear  [unfilled], [Courtesy Letter:] : I had the pleasure of seeing your patient, [unfilled], in my office today. [Please see my note below.] : Please see my note below. [Consult Closing:] : Thank you very much for allowing me to participate in the care of this patient.  If you have any questions, please do not hesitate to contact me. [Sincerely,] : Sincerely, [FreeTextEntry2] : Dr. Eugenio Hills\par  [address] [place] [address], [address]\[place] [id number] [FreeTextEntry3] : Dacia López MD Attending Physician, Pediatric Rheumatology [facility] | [facility]

## 2024-08-11 NOTE — HISTORY OF PRESENT ILLNESS
[VANNESSA] : VANNESSA [ds-DNA] : ds-DNA [Sm] : Sm [RNP] : RNP [SSA] : SSA [Yes] : The patient is using sunscreen [Glomerulonephritis] : glomerulonephritis [Noncontributory] : The patient's family history was noncontributory [Unlimited ADLs] : able to do activities of daily living without limitations [SLEDXDATE] : 05/20/2022 [FreeTextEntry1] : Domonique presents for follow-up today.   She is doing well overall. No fevers, rashes, oral ulcers, hair loss, joint pain or edema. Eating and drinking well. Urinating normally. Using sunscreen regularly. Recently traveled to [place] in July - no issues during trip.   Current medications:  - CellCept 1000 mg PO BID  -  mg PO daily  - Lisinopril 15 mg PO daily  LMP: about 2 weeks ago   Saw ophthalmology on 4/2024 - Qi reports everything was stable/normal, no report in chart, she plans to follow-up in 6 months   No fever, headache, visual changes, dizziness. mouth sores, cough, congestion, chest pain, difficulty breathing, nausea, vomiting, diarrhea, constipation, blood in the stool, abdominal pain, dysuria, hematuria, joint pain, peripheral edema, back pain, or rash.  [facility]

## 2024-08-11 NOTE — PHYSICAL EXAM
[Normal] : normal [PERRLA] : CAIO [Lips] : normal lips [Oral] : normal oral cavity  [Mucosa] : moist and pink mucosa [Palate] : normal palate [S1, S2 Present] : S1, S2 present [Peripheral Pulses] : positive peripheral pulses [Respiratory Effort] : normal respiratory effort [Clear to auscultation] : clear to auscultation [Soft] : soft [NonTender] : non tender [Non Distended] : non distended [Normal Bowel Sounds] : normal bowel sounds [No Hepatosplenomegaly] : no hepatosplenomegaly [No Abnormal Lymph Nodes Palpated] : no abnormal lymph nodes palpated [Refer to Joint Diagram Below] : refer to joint diagram below [Range Of Motion] : full range of motion [Cranial nerves grossly intact] : cranial nerves grossly intact [Intact Judgement] : intact judgement  [Insight Insight] : intact insight [Not Examined] : not examined [0] : 0 [_______] : Knee: [unfilled] [Acute distress] : no acute distress [Malar Erythema] : no malar erythema [Erythematous Oropharynx] : nonerythematous oropharynx [Erythematous Conjunctiva] : nonerythematous conjunctiva [Ulcers] : no ulcers [Lesions] : no lesions [Induration] : no induration [Erythematous] : not erythematous [Murmurs] : no murmurs [Mass (___cm)] : no neck masses [Peripheral Edema] : no peripheral edema  [Joint effusions] : no joint effusions [1] : 1 [de-identified] : keratosis pilaris on bilateral upper arms  [FreeTextEntry1] : well-appearing [de-identified] : no active arthritis [NumbJointsActiveArthritis] : 0

## 2024-08-11 NOTE — CONSULT LETTER
[Dear  ___] : Dear  [unfilled], [Courtesy Letter:] : I had the pleasure of seeing your patient, [unfilled], in my office today. [Please see my note below.] : Please see my note below. [Consult Closing:] : Thank you very much for allowing me to participate in the care of this patient.  If you have any questions, please do not hesitate to contact me. [Sincerely,] : Sincerely, [FreeTextEntry2] : Dr. Eugenio Hills\par  [address] [place] [address], [address]\[place] [id number] [FreeTextEntry3] : Dacia López MD Attending Physician, Pediatric Rheumatology [facility] | [facility]

## 2024-08-11 NOTE — SOCIAL HISTORY
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

## 2024-08-13 ENCOUNTER — NON-APPOINTMENT (OUTPATIENT)
Age: 19
End: 2024-08-13

## 2024-10-22 ENCOUNTER — APPOINTMENT (OUTPATIENT)
Dept: PEDIATRIC RHEUMATOLOGY | Facility: CLINIC | Age: 19
End: 2024-10-22

## 2024-10-30 ENCOUNTER — APPOINTMENT (OUTPATIENT)
Dept: PEDIATRIC RHEUMATOLOGY | Facility: CLINIC | Age: 19
End: 2024-10-30

## 2024-10-30 VITALS
HEART RATE: 81 BPM | BODY MASS INDEX: 26.37 KG/M2 | WEIGHT: 146.98 LBS | HEIGHT: 62.6 IN | DIASTOLIC BLOOD PRESSURE: 57 MMHG | SYSTOLIC BLOOD PRESSURE: 122 MMHG

## 2024-10-30 DIAGNOSIS — E55.9 VITAMIN D DEFICIENCY, UNSPECIFIED: ICD-10-CM

## 2024-10-30 DIAGNOSIS — E06.3 AUTOIMMUNE THYROIDITIS: ICD-10-CM

## 2024-10-30 DIAGNOSIS — M32.9 SYSTEMIC LUPUS ERYTHEMATOSUS, UNSPECIFIED: ICD-10-CM

## 2024-10-30 DIAGNOSIS — Z71.9 COUNSELING, UNSPECIFIED: ICD-10-CM

## 2024-10-30 DIAGNOSIS — D84.9 IMMUNODEFICIENCY, UNSPECIFIED: ICD-10-CM

## 2024-10-30 DIAGNOSIS — Z79.899 OTHER LONG TERM (CURRENT) DRUG THERAPY: ICD-10-CM

## 2024-10-30 DIAGNOSIS — M32.14 GLOMERULAR DISEASE IN SYSTEMIC LUPUS ERYTHEMATOSUS: ICD-10-CM

## 2024-10-30 PROCEDURE — 99215 OFFICE O/P EST HI 40 MIN: CPT

## 2024-10-30 PROCEDURE — G2211 COMPLEX E/M VISIT ADD ON: CPT | Mod: NC

## 2024-10-31 LAB
ALBUMIN SERPL ELPH-MCNC: 5.2 G/DL
ALP BLD-CCNC: 55 U/L
ALT SERPL-CCNC: 17 U/L
ANION GAP SERPL CALC-SCNC: 14 MMOL/L
APPEARANCE: CLEAR
AST SERPL-CCNC: 18 U/L
BASOPHILS # BLD AUTO: 0.04 K/UL
BASOPHILS NFR BLD AUTO: 1 %
BILIRUB SERPL-MCNC: 0.8 MG/DL
BILIRUBIN URINE: NEGATIVE
BLOOD URINE: NEGATIVE
BUN SERPL-MCNC: 10 MG/DL
C3 SERPL-MCNC: 109 MG/DL
C4 SERPL-MCNC: 14 MG/DL
CALCIUM SERPL-MCNC: 10 MG/DL
CHLORIDE SERPL-SCNC: 100 MMOL/L
CO2 SERPL-SCNC: 24 MMOL/L
COLOR: YELLOW
CREAT SERPL-MCNC: 0.47 MG/DL
CREAT SPEC-SCNC: 35 MG/DL
CREAT/PROT UR: 0.2 RATIO
CRP SERPL-MCNC: <3 MG/L
DSDNA AB SER-ACNC: 15 IU/ML
EGFR: 139 ML/MIN/1.73M2
ENA RNP AB SER IA-ACNC: >8 AL
ENA SM AB SER IA-ACNC: >8 AL
ENA SS-A AB SER IA-ACNC: 0.2 AL
ENA SS-B AB SER IA-ACNC: <0.2 AL
EOSINOPHIL # BLD AUTO: 0.12 K/UL
EOSINOPHIL NFR BLD AUTO: 3 %
ERYTHROCYTE [SEDIMENTATION RATE] IN BLOOD BY WESTERGREN METHOD: 7 MM/HR
GLUCOSE QUALITATIVE U: NEGATIVE MG/DL
GLUCOSE SERPL-MCNC: 94 MG/DL
HCT VFR BLD CALC: 45 %
HGB BLD-MCNC: 13.6 G/DL
IMM GRANULOCYTES NFR BLD AUTO: 0 %
KETONES URINE: NEGATIVE MG/DL
LEUKOCYTE ESTERASE URINE: NEGATIVE
LYMPHOCYTES # BLD AUTO: 1.88 K/UL
LYMPHOCYTES NFR BLD AUTO: 46.5 %
MAN DIFF?: NORMAL
MCHC RBC-ENTMCNC: 28.4 PG
MCHC RBC-ENTMCNC: 30.2 G/DL
MCV RBC AUTO: 93.9 FL
MONOCYTES # BLD AUTO: 0.4 K/UL
MONOCYTES NFR BLD AUTO: 9.9 %
NEUTROPHILS # BLD AUTO: 1.6 K/UL
NEUTROPHILS NFR BLD AUTO: 39.6 %
NITRITE URINE: NEGATIVE
PH URINE: 6
PLATELET # BLD AUTO: 262 K/UL
POTASSIUM SERPL-SCNC: 4.2 MMOL/L
PROT SERPL-MCNC: 8.3 G/DL
PROT UR-MCNC: 7 MG/DL
PROTEIN URINE: NEGATIVE MG/DL
RBC # BLD: 4.79 M/UL
RBC # FLD: 12.1 %
SODIUM SERPL-SCNC: 138 MMOL/L
SPECIFIC GRAVITY URINE: 1.01
THYROGLOB AB SERPL-ACNC: 17.7 IU/ML
THYROPEROXIDASE AB SERPL IA-ACNC: 23 IU/ML
TSH SERPL-ACNC: 2.11 UIU/ML
UROBILINOGEN URINE: 0.2 MG/DL
WBC # FLD AUTO: 4.04 K/UL

## 2024-12-18 ENCOUNTER — APPOINTMENT (OUTPATIENT)
Dept: PEDIATRIC RHEUMATOLOGY | Facility: CLINIC | Age: 19
End: 2024-12-18

## 2025-01-28 ENCOUNTER — RX RENEWAL (OUTPATIENT)
Age: 20
End: 2025-01-28